# Patient Record
Sex: FEMALE | Race: WHITE | NOT HISPANIC OR LATINO | Employment: OTHER | URBAN - METROPOLITAN AREA
[De-identification: names, ages, dates, MRNs, and addresses within clinical notes are randomized per-mention and may not be internally consistent; named-entity substitution may affect disease eponyms.]

---

## 2017-03-17 ENCOUNTER — APPOINTMENT (OUTPATIENT)
Dept: WOMENS IMAGING | Facility: HOSPITAL | Age: 67
End: 2017-03-17

## 2017-03-17 DIAGNOSIS — Z00.00 ANNUAL PHYSICAL EXAM: Primary | ICD-10-CM

## 2017-03-17 DIAGNOSIS — Z00.00 HEALTH CARE MAINTENANCE: ICD-10-CM

## 2017-03-17 PROCEDURE — 77067 SCR MAMMO BI INCL CAD: CPT | Performed by: RADIOLOGY

## 2017-03-20 LAB
25(OH)D3+25(OH)D2 SERPL-MCNC: 39 NG/ML
ALBUMIN SERPL-MCNC: 4.3 G/DL (ref 3.5–5.2)
ALBUMIN/GLOB SERPL: 1.7 G/DL
ALP SERPL-CCNC: 67 U/L (ref 40–129)
ALT SERPL-CCNC: 12 U/L (ref 5–33)
AST SERPL-CCNC: 16 U/L (ref 5–32)
BASOPHILS # BLD AUTO: 0.02 10*3/MM3 (ref 0–0.2)
BASOPHILS NFR BLD AUTO: 0.4 % (ref 0–2)
BILIRUB SERPL-MCNC: 0.6 MG/DL (ref 0.2–1.2)
BUN SERPL-MCNC: 11 MG/DL (ref 8–23)
BUN/CREAT SERPL: 11.6 (ref 7–25)
CALCIUM SERPL-MCNC: 9.4 MG/DL (ref 8.8–10.5)
CHLORIDE SERPL-SCNC: 104 MMOL/L (ref 98–107)
CHOLEST SERPL-MCNC: 195 MG/DL (ref 0–200)
CO2 SERPL-SCNC: 27.7 MMOL/L (ref 22–29)
CREAT SERPL-MCNC: 0.95 MG/DL (ref 0.57–1)
EOSINOPHIL # BLD AUTO: 0.14 10*3/MM3 (ref 0.1–0.3)
EOSINOPHIL NFR BLD AUTO: 3 % (ref 0–4)
ERYTHROCYTE [DISTWIDTH] IN BLOOD BY AUTOMATED COUNT: 12.8 % (ref 11.5–14.5)
GLOBULIN SER CALC-MCNC: 2.6 GM/DL
GLUCOSE SERPL-MCNC: 90 MG/DL (ref 65–99)
HCT VFR BLD AUTO: 41.9 % (ref 37–47)
HDLC SERPL-MCNC: 53 MG/DL (ref 40–60)
HGB BLD-MCNC: 14.3 G/DL (ref 12–16)
IMM GRANULOCYTES # BLD: 0.01 10*3/MM3 (ref 0–0.03)
IMM GRANULOCYTES NFR BLD: 0.2 % (ref 0–0.5)
LDLC SERPL CALC-MCNC: 115 MG/DL (ref 0–100)
LYMPHOCYTES # BLD AUTO: 1.34 10*3/MM3 (ref 0.6–4.8)
LYMPHOCYTES NFR BLD AUTO: 28.7 % (ref 20–45)
MCH RBC QN AUTO: 30.4 PG (ref 27–31)
MCHC RBC AUTO-ENTMCNC: 34.1 G/DL (ref 31–37)
MCV RBC AUTO: 89 FL (ref 81–99)
MONOCYTES # BLD AUTO: 0.2 10*3/MM3 (ref 0–1)
MONOCYTES NFR BLD AUTO: 4.3 % (ref 3–8)
NEUTROPHILS # BLD AUTO: 2.96 10*3/MM3 (ref 1.5–8.3)
NEUTROPHILS NFR BLD AUTO: 63.4 % (ref 45–70)
NRBC BLD AUTO-RTO: 0 /100 WBC (ref 0–0)
PLATELET # BLD AUTO: 270 10*3/MM3 (ref 140–500)
POTASSIUM SERPL-SCNC: 4.2 MMOL/L (ref 3.5–5.2)
PROT SERPL-MCNC: 6.9 G/DL (ref 6–8.5)
RBC # BLD AUTO: 4.71 10*6/MM3 (ref 4.2–5.4)
SODIUM SERPL-SCNC: 143 MMOL/L (ref 136–145)
TRIGL SERPL-MCNC: 135 MG/DL (ref 0–150)
TSH SERPL DL<=0.005 MIU/L-ACNC: 4.39 MIU/ML (ref 0.27–4.2)
VLDLC SERPL CALC-MCNC: 27 MG/DL (ref 7–27)
WBC # BLD AUTO: 4.67 10*3/MM3 (ref 4.8–10.8)

## 2017-03-27 ENCOUNTER — OFFICE VISIT (OUTPATIENT)
Dept: FAMILY MEDICINE CLINIC | Facility: CLINIC | Age: 67
End: 2017-03-27

## 2017-03-27 VITALS
TEMPERATURE: 98.2 F | WEIGHT: 159 LBS | BODY MASS INDEX: 29.26 KG/M2 | HEIGHT: 62 IN | HEART RATE: 92 BPM | OXYGEN SATURATION: 95 % | DIASTOLIC BLOOD PRESSURE: 68 MMHG | SYSTOLIC BLOOD PRESSURE: 120 MMHG

## 2017-03-27 DIAGNOSIS — B02.29 POSTHERPETIC NEURALGIA: ICD-10-CM

## 2017-03-27 DIAGNOSIS — E03.9 HYPOTHYROIDISM (ACQUIRED): ICD-10-CM

## 2017-03-27 DIAGNOSIS — E55.9 AVITAMINOSIS D: Primary | ICD-10-CM

## 2017-03-27 PROCEDURE — 99213 OFFICE O/P EST LOW 20 MIN: CPT | Performed by: FAMILY MEDICINE

## 2017-03-27 RX ORDER — GABAPENTIN 300 MG/1
300 CAPSULE ORAL DAILY
Qty: 90 CAPSULE | Refills: 1 | Status: SHIPPED | OUTPATIENT
Start: 2017-03-27 | End: 2017-08-28

## 2017-03-27 RX ORDER — LEVOTHYROXINE SODIUM 0.05 MG/1
50 TABLET ORAL DAILY
Qty: 90 TABLET | Refills: 0 | Status: SHIPPED | OUTPATIENT
Start: 2017-03-27 | End: 2017-07-11 | Stop reason: SDUPTHER

## 2017-03-27 RX ORDER — CYCLOBENZAPRINE HCL 5 MG
5 TABLET ORAL 3 TIMES DAILY PRN
Qty: 90 TABLET | Refills: 0 | Status: SHIPPED | OUTPATIENT
Start: 2017-03-27 | End: 2018-07-17 | Stop reason: SDUPTHER

## 2017-03-27 NOTE — PATIENT INSTRUCTIONS
Start 50mcg Levothyroxine and RTO in 6 to 8 weeks for non fasting labs, rechecking Thyroid and Vitamin D.

## 2017-03-27 NOTE — PROGRESS NOTES
Subjective   Fatoumata Perry is a 67 y.o. female with   Chief Complaint   Patient presents with   • Hyperlipidemia     follow up on labs, also needs pneumovax   .    History of Present Illness     Fatoumata is a 67 yr old white female that presents today for follow up of HLD, and Vitamin D Deficiency. Fatoumata currently uses Gabapentin to control pain secondary to Postherpetic Neuralgia.  She has also been using Vitamin D3 at 2000 IU per day to improve vitamin D Def.    Fatoumata has had pain and numbness to her lower right extremity at the level of her lateral thigh.This has resolved however at this time.    She reports that she is trying to exercise more.      The following portions of the patient's history were reviewed and updated as appropriate: allergies, current medications, past family history, past medical history, past social history, past surgical history and problem list.    Review of Systems   Endocrine:        Vit. D Def.     Neurological: Positive for numbness (to lower right extremity).   All other systems reviewed and are negative.      Objective     Vitals:    03/27/17 0738   BP: 120/68   Pulse: 92   Temp: 98.2 °F (36.8 °C)   SpO2: 95%     BP Readings from Last 3 Encounters:   03/27/17 120/68   10/18/16 122/66   06/21/16 120/80      Wt Readings from Last 3 Encounters:   03/27/17 159 lb (72.1 kg)   10/18/16 157 lb (71.2 kg)   06/21/16 161 lb (73 kg)        No results found for this or any previous visit (from the past 168 hour(s)).  The above results have been reviewed and explained to Fatoumata with her understanding.  A copy has been provided to her.     Physical Exam   Constitutional: She is oriented to person, place, and time. She appears well-developed and well-nourished.   HENT:   Head: Normocephalic and atraumatic.   Neck: Trachea normal and phonation normal. Neck supple. Normal carotid pulses present. Carotid bruit is not present. No thyroid mass and no thyromegaly present.   Cardiovascular:  Normal rate, regular rhythm and normal heart sounds.  Exam reveals no gallop and no friction rub.    No murmur heard.  Pulmonary/Chest: Effort normal and breath sounds normal. No respiratory distress. She has no decreased breath sounds. She has no wheezes. She has no rhonchi. She has no rales.   Lymphadenopathy:     She has no cervical adenopathy.   Neurological: She is alert and oriented to person, place, and time.   Skin: Skin is warm and dry. No rash noted.   Psychiatric: She has a normal mood and affect. Her speech is normal and behavior is normal. Judgment and thought content normal. Cognition and memory are normal.   Nursing note and vitals reviewed.      Assessment/Plan   Fatoumata was seen today for hyperlipidemia.    Diagnoses and all orders for this visit:    Avitaminosis D  -     Vitamin D 25 Hydroxy; Future    Postherpetic neuralgia    Hypothyroidism (acquired)  -     CBC & Differential; Future  -     Comprehensive Metabolic Panel; Future  -     TSH; Future    Other orders  -     gabapentin (NEURONTIN) 300 MG capsule; Take 1 capsule by mouth Daily.  -     levothyroxine (SYNTHROID) 50 MCG tablet; Take 1 tablet by mouth Daily.  -     cyclobenzaprine (FLEXERIL) 5 MG tablet; Take 1 tablet by mouth 3 (Three) Times a Day As Needed for Muscle Spasms.        Return in about 6 months (around 9/27/2017).    Scribed for Cosme Cho MD by Angel Rajan. 03/27/2017    ICosme MD personally performed the services described in this documentation, as scribed by Angel Rajan in my presence, and it is both accurate and complete

## 2017-04-28 DIAGNOSIS — E03.9 HYPOTHYROIDISM (ACQUIRED): ICD-10-CM

## 2017-04-28 DIAGNOSIS — E55.9 AVITAMINOSIS D: ICD-10-CM

## 2017-05-01 LAB
25(OH)D3+25(OH)D2 SERPL-MCNC: 37 NG/ML
ALBUMIN SERPL-MCNC: 4 G/DL (ref 3.5–5.2)
ALBUMIN/GLOB SERPL: 1.5 G/DL
ALP SERPL-CCNC: 75 U/L (ref 40–129)
ALT SERPL-CCNC: 16 U/L (ref 5–33)
AST SERPL-CCNC: 18 U/L (ref 5–32)
BASOPHILS # BLD AUTO: 0.03 10*3/MM3 (ref 0–0.2)
BASOPHILS NFR BLD AUTO: 0.7 % (ref 0–2)
BILIRUB SERPL-MCNC: 0.4 MG/DL (ref 0.2–1.2)
BUN SERPL-MCNC: 19 MG/DL (ref 8–23)
BUN/CREAT SERPL: 22.9 (ref 7–25)
CALCIUM SERPL-MCNC: 8.9 MG/DL (ref 8.8–10.5)
CHLORIDE SERPL-SCNC: 104 MMOL/L (ref 98–107)
CO2 SERPL-SCNC: 26.5 MMOL/L (ref 22–29)
CREAT SERPL-MCNC: 0.83 MG/DL (ref 0.57–1)
EOSINOPHIL # BLD AUTO: 0.11 10*3/MM3 (ref 0.1–0.3)
EOSINOPHIL NFR BLD AUTO: 2.4 % (ref 0–4)
ERYTHROCYTE [DISTWIDTH] IN BLOOD BY AUTOMATED COUNT: 13.4 % (ref 11.5–14.5)
GLOBULIN SER CALC-MCNC: 2.7 GM/DL
GLUCOSE SERPL-MCNC: 76 MG/DL (ref 65–99)
HCT VFR BLD AUTO: 40.7 % (ref 37–47)
HGB BLD-MCNC: 13.9 G/DL (ref 12–16)
IMM GRANULOCYTES # BLD: 0.01 10*3/MM3 (ref 0–0.03)
IMM GRANULOCYTES NFR BLD: 0.2 % (ref 0–0.5)
LYMPHOCYTES # BLD AUTO: 1.18 10*3/MM3 (ref 0.6–4.8)
LYMPHOCYTES NFR BLD AUTO: 25.8 % (ref 20–45)
MCH RBC QN AUTO: 30.3 PG (ref 27–31)
MCHC RBC AUTO-ENTMCNC: 34.2 G/DL (ref 31–37)
MCV RBC AUTO: 88.9 FL (ref 81–99)
MONOCYTES # BLD AUTO: 0.18 10*3/MM3 (ref 0–1)
MONOCYTES NFR BLD AUTO: 3.9 % (ref 3–8)
NEUTROPHILS # BLD AUTO: 3.07 10*3/MM3 (ref 1.5–8.3)
NEUTROPHILS NFR BLD AUTO: 67 % (ref 45–70)
NRBC BLD AUTO-RTO: 0 /100 WBC (ref 0–0)
PLATELET # BLD AUTO: 265 10*3/MM3 (ref 140–500)
POTASSIUM SERPL-SCNC: 4.2 MMOL/L (ref 3.5–5.2)
PROT SERPL-MCNC: 6.7 G/DL (ref 6–8.5)
RBC # BLD AUTO: 4.58 10*6/MM3 (ref 4.2–5.4)
SODIUM SERPL-SCNC: 143 MMOL/L (ref 136–145)
TSH SERPL DL<=0.005 MIU/L-ACNC: 0.96 MIU/ML (ref 0.27–4.2)
WBC # BLD AUTO: 4.58 10*3/MM3 (ref 4.8–10.8)

## 2017-07-11 RX ORDER — LEVOTHYROXINE SODIUM 0.05 MG/1
50 TABLET ORAL DAILY
Qty: 90 TABLET | Refills: 0 | Status: SHIPPED | OUTPATIENT
Start: 2017-07-11 | End: 2017-08-28 | Stop reason: SDUPTHER

## 2017-08-21 DIAGNOSIS — E55.9 AVITAMINOSIS D: Primary | ICD-10-CM

## 2017-08-21 DIAGNOSIS — E03.9 HYPOTHYROIDISM (ACQUIRED): ICD-10-CM

## 2017-08-21 LAB
25(OH)D3+25(OH)D2 SERPL-MCNC: 41.9 NG/ML
ALBUMIN SERPL-MCNC: 4.2 G/DL (ref 3.5–5.2)
ALBUMIN/GLOB SERPL: 1.5 G/DL
ALP SERPL-CCNC: 72 U/L (ref 40–129)
ALT SERPL-CCNC: 18 U/L (ref 5–33)
AST SERPL-CCNC: 21 U/L (ref 5–32)
BASOPHILS # BLD AUTO: 0.02 10*3/MM3 (ref 0–0.2)
BASOPHILS NFR BLD AUTO: 0.5 % (ref 0–2)
BILIRUB SERPL-MCNC: 0.7 MG/DL (ref 0.2–1.2)
BUN SERPL-MCNC: 12 MG/DL (ref 8–23)
BUN/CREAT SERPL: 13.6 (ref 7–25)
CALCIUM SERPL-MCNC: 9.1 MG/DL (ref 8.8–10.5)
CHLORIDE SERPL-SCNC: 106 MMOL/L (ref 98–107)
CO2 SERPL-SCNC: 25.2 MMOL/L (ref 22–29)
CREAT SERPL-MCNC: 0.88 MG/DL (ref 0.57–1)
EOSINOPHIL # BLD AUTO: 0.12 10*3/MM3 (ref 0.1–0.3)
EOSINOPHIL NFR BLD AUTO: 2.8 % (ref 0–4)
ERYTHROCYTE [DISTWIDTH] IN BLOOD BY AUTOMATED COUNT: 13.3 % (ref 11.5–14.5)
GLOBULIN SER CALC-MCNC: 2.8 GM/DL
GLUCOSE SERPL-MCNC: 93 MG/DL (ref 65–99)
HCT VFR BLD AUTO: 42.5 % (ref 37–47)
HGB BLD-MCNC: 14.2 G/DL (ref 12–16)
IMM GRANULOCYTES # BLD: 0.01 10*3/MM3 (ref 0–0.03)
IMM GRANULOCYTES NFR BLD: 0.2 % (ref 0–0.5)
LYMPHOCYTES # BLD AUTO: 1.23 10*3/MM3 (ref 0.6–4.8)
LYMPHOCYTES NFR BLD AUTO: 29.1 % (ref 20–45)
MCH RBC QN AUTO: 30 PG (ref 27–31)
MCHC RBC AUTO-ENTMCNC: 33.4 G/DL (ref 31–37)
MCV RBC AUTO: 89.9 FL (ref 81–99)
MONOCYTES # BLD AUTO: 0.2 10*3/MM3 (ref 0–1)
MONOCYTES NFR BLD AUTO: 4.7 % (ref 3–8)
NEUTROPHILS # BLD AUTO: 2.64 10*3/MM3 (ref 1.5–8.3)
NEUTROPHILS NFR BLD AUTO: 62.7 % (ref 45–70)
NRBC BLD AUTO-RTO: 0 /100 WBC (ref 0–0)
PLATELET # BLD AUTO: 257 10*3/MM3 (ref 140–500)
POTASSIUM SERPL-SCNC: 4.2 MMOL/L (ref 3.5–5.2)
PROT SERPL-MCNC: 7 G/DL (ref 6–8.5)
RBC # BLD AUTO: 4.73 10*6/MM3 (ref 4.2–5.4)
SODIUM SERPL-SCNC: 145 MMOL/L (ref 136–145)
TSH SERPL DL<=0.005 MIU/L-ACNC: 1.29 MIU/ML (ref 0.27–4.2)
WBC # BLD AUTO: 4.22 10*3/MM3 (ref 4.8–10.8)

## 2017-08-28 ENCOUNTER — OFFICE VISIT (OUTPATIENT)
Dept: FAMILY MEDICINE CLINIC | Facility: CLINIC | Age: 67
End: 2017-08-28

## 2017-08-28 VITALS
SYSTOLIC BLOOD PRESSURE: 120 MMHG | HEART RATE: 77 BPM | BODY MASS INDEX: 29.08 KG/M2 | WEIGHT: 158 LBS | TEMPERATURE: 98.9 F | OXYGEN SATURATION: 98 % | HEIGHT: 62 IN | DIASTOLIC BLOOD PRESSURE: 70 MMHG

## 2017-08-28 DIAGNOSIS — E03.9 ACQUIRED HYPOTHYROIDISM: ICD-10-CM

## 2017-08-28 DIAGNOSIS — Z23 IMMUNIZATION DUE: ICD-10-CM

## 2017-08-28 DIAGNOSIS — E55.9 AVITAMINOSIS D: Primary | ICD-10-CM

## 2017-08-28 PROCEDURE — G0009 ADMIN PNEUMOCOCCAL VACCINE: HCPCS | Performed by: FAMILY MEDICINE

## 2017-08-28 PROCEDURE — 99213 OFFICE O/P EST LOW 20 MIN: CPT | Performed by: FAMILY MEDICINE

## 2017-08-28 PROCEDURE — 90732 PPSV23 VACC 2 YRS+ SUBQ/IM: CPT | Performed by: FAMILY MEDICINE

## 2017-08-28 RX ORDER — LEVOTHYROXINE SODIUM 0.05 MG/1
50 TABLET ORAL DAILY
Qty: 90 TABLET | Refills: 1 | Status: SHIPPED | OUTPATIENT
Start: 2017-08-28 | End: 2018-03-29 | Stop reason: SDUPTHER

## 2017-08-28 NOTE — PROGRESS NOTES
Subjective   Fatoumata Perry is a 67 y.o. female with   Chief Complaint   Patient presents with   • Hypothyroidism     medication follow up   • immunization due     needs pneumovax and TDAP.  Must get TDAP at pharmacy   .    History of Present Illness     Patient presents today for follow up of Hypothyroidism, and Vitamin D Deficiency.  Current medications are well tolerated.  Patient has requested Pneumovax and TdAp to be sent to her pharmacy.     The following portions of the patient's history were reviewed and updated as appropriate: allergies, current medications, past family history, past medical history, past social history, past surgical history and problem list.    Review of Systems   Endocrine:        Vit D Def  hypothyroidism    All other systems reviewed and are negative.      Objective     Vitals:    08/28/17 0812   BP: 120/70   Pulse: 77   Temp: 98.9 °F (37.2 °C)   SpO2: 98%     BP Readings from Last 3 Encounters:   08/28/17 120/70   03/27/17 120/68   10/18/16 122/66      Wt Readings from Last 3 Encounters:   08/28/17 158 lb (71.7 kg)   03/27/17 159 lb (72.1 kg)   10/18/16 157 lb (71.2 kg)        No results found for this or any previous visit (from the past 168 hour(s)).  The above results have been reviewed and explained to the patient.  The patient has been provided a copy    Physical Exam   Constitutional: She is oriented to person, place, and time. She appears well-developed and well-nourished.   HENT:   Head: Normocephalic and atraumatic.   Neck: Trachea normal and phonation normal. Neck supple. Normal carotid pulses present. Carotid bruit is not present. No thyroid mass and no thyromegaly present.   Cardiovascular: Normal rate, regular rhythm and normal heart sounds.  Exam reveals no gallop and no friction rub.    No murmur heard.  Pulmonary/Chest: Effort normal and breath sounds normal. No respiratory distress. She has no decreased breath sounds. She has no wheezes. She has no rhonchi. She  has no rales.   Lymphadenopathy:     She has no cervical adenopathy.   Neurological: She is alert and oriented to person, place, and time.   Skin: Skin is warm and dry. No rash noted.   Psychiatric: She has a normal mood and affect. Her speech is normal and behavior is normal. Judgment and thought content normal. Cognition and memory are normal.   Nursing note and vitals reviewed.      Assessment/Plan   Fatoumata was seen today for hypothyroidism and immunization due.    Diagnoses and all orders for this visit:    Avitaminosis D    Acquired hypothyroidism    Immunization due  -     Discontinue: pneumococcal polysaccharide 23-valent (PNEUMOVAX-23) vaccine 0.5 mL; Inject 0.5 mL into the shoulder, thigh, or buttocks 1 (One) Time.  -     Pneumococcal Polysaccharide Vaccine 23-Valent Greater Than or Equal To 1yo Subcutaneous / IM    Other orders  -     levothyroxine (SYNTHROID) 50 MCG tablet; Take 1 tablet by mouth Daily.  -     Tdap (BOOSTRIX) 5-2.5-18.5 LF-MCG/0.5 injection; Inject 0.5 mL into the shoulder, thigh, or buttocks 1 (One) Time for 1 dose.        Return in about 6 months (around 2/28/2018).    Scribed for Cosme Cho MD by Angel Rajan. 08/28/2017    ICosme MD personally performed the services described in this documentation, as scribed by Angel Rajan in my presence, and it is both accurate and complete

## 2017-12-19 ENCOUNTER — TELEPHONE (OUTPATIENT)
Dept: FAMILY MEDICINE CLINIC | Facility: CLINIC | Age: 67
End: 2017-12-19

## 2017-12-19 RX ORDER — AZITHROMYCIN 250 MG/1
TABLET, FILM COATED ORAL
Qty: 6 TABLET | Refills: 0 | Status: SHIPPED | OUTPATIENT
Start: 2017-12-19 | End: 2018-04-23

## 2017-12-19 NOTE — TELEPHONE ENCOUNTER
Pt calling with headache, facial pain and pressure, congestion and dry cough x few days.  She is going out of town tomorrow and requests an antibiotic.  Please advise.

## 2018-03-29 RX ORDER — LEVOTHYROXINE SODIUM 0.05 MG/1
50 TABLET ORAL DAILY
Qty: 90 TABLET | Refills: 1 | Status: SHIPPED | OUTPATIENT
Start: 2018-03-29 | End: 2018-04-23 | Stop reason: SDUPTHER

## 2018-04-12 DIAGNOSIS — E55.9 AVITAMINOSIS D: Primary | ICD-10-CM

## 2018-04-12 DIAGNOSIS — Z00.00 HEALTH CARE MAINTENANCE: ICD-10-CM

## 2018-04-12 DIAGNOSIS — E03.9 HYPOTHYROIDISM (ACQUIRED): ICD-10-CM

## 2018-04-16 LAB
25(OH)D3+25(OH)D2 SERPL-MCNC: 50.3 NG/ML
ALBUMIN SERPL-MCNC: 4.3 G/DL (ref 3.5–5.2)
ALBUMIN/GLOB SERPL: 1.6 G/DL
ALP SERPL-CCNC: 76 U/L (ref 40–129)
ALT SERPL-CCNC: 11 U/L (ref 5–33)
AST SERPL-CCNC: 17 U/L (ref 5–32)
BASOPHILS # BLD AUTO: 0.03 10*3/MM3 (ref 0–0.2)
BASOPHILS NFR BLD AUTO: 0.8 % (ref 0–2)
BILIRUB SERPL-MCNC: 0.4 MG/DL (ref 0.2–1.2)
BUN SERPL-MCNC: 10 MG/DL (ref 8–23)
BUN/CREAT SERPL: 11.5 (ref 7–25)
CALCIUM SERPL-MCNC: 9.1 MG/DL (ref 8.8–10.5)
CHLORIDE SERPL-SCNC: 107 MMOL/L (ref 98–107)
CHOLEST SERPL-MCNC: 195 MG/DL (ref 0–200)
CO2 SERPL-SCNC: 27.6 MMOL/L (ref 22–29)
CREAT SERPL-MCNC: 0.87 MG/DL (ref 0.57–1)
EOSINOPHIL # BLD AUTO: 0.14 10*3/MM3 (ref 0.1–0.3)
EOSINOPHIL NFR BLD AUTO: 3.5 % (ref 0–4)
ERYTHROCYTE [DISTWIDTH] IN BLOOD BY AUTOMATED COUNT: 13 % (ref 11.5–14.5)
GFR SERPLBLD CREATININE-BSD FMLA CKD-EPI: 65 ML/MIN/1.73
GFR SERPLBLD CREATININE-BSD FMLA CKD-EPI: 78 ML/MIN/1.73
GLOBULIN SER CALC-MCNC: 2.7 GM/DL
GLUCOSE SERPL-MCNC: 88 MG/DL (ref 65–99)
HCT VFR BLD AUTO: 43.3 % (ref 37–47)
HDLC SERPL-MCNC: 56 MG/DL (ref 40–60)
HGB BLD-MCNC: 14.8 G/DL (ref 12–16)
IMM GRANULOCYTES # BLD: 0.01 10*3/MM3 (ref 0–0.03)
IMM GRANULOCYTES NFR BLD: 0.3 % (ref 0–0.5)
LDLC SERPL CALC-MCNC: 117 MG/DL (ref 0–100)
LYMPHOCYTES # BLD AUTO: 1.23 10*3/MM3 (ref 0.6–4.8)
LYMPHOCYTES NFR BLD AUTO: 30.8 % (ref 20–45)
MCH RBC QN AUTO: 31.2 PG (ref 27–31)
MCHC RBC AUTO-ENTMCNC: 34.2 G/DL (ref 31–37)
MCV RBC AUTO: 91.2 FL (ref 81–99)
MONOCYTES # BLD AUTO: 0.17 10*3/MM3 (ref 0–1)
MONOCYTES NFR BLD AUTO: 4.3 % (ref 3–8)
NEUTROPHILS # BLD AUTO: 2.42 10*3/MM3 (ref 1.5–8.3)
NEUTROPHILS NFR BLD AUTO: 60.3 % (ref 45–70)
NRBC BLD AUTO-RTO: 0 /100 WBC (ref 0–0)
PLATELET # BLD AUTO: 258 10*3/MM3 (ref 140–500)
POTASSIUM SERPL-SCNC: 4.4 MMOL/L (ref 3.5–5.2)
PROT SERPL-MCNC: 7 G/DL (ref 6–8.5)
RBC # BLD AUTO: 4.75 10*6/MM3 (ref 4.2–5.4)
SODIUM SERPL-SCNC: 145 MMOL/L (ref 136–145)
TRIGL SERPL-MCNC: 112 MG/DL (ref 0–150)
TSH SERPL DL<=0.005 MIU/L-ACNC: 1.83 MIU/ML (ref 0.27–4.2)
VLDLC SERPL CALC-MCNC: 22.4 MG/DL (ref 7–27)
WBC # BLD AUTO: 4 10*3/MM3 (ref 4.8–10.8)

## 2018-04-23 ENCOUNTER — OFFICE VISIT (OUTPATIENT)
Dept: FAMILY MEDICINE CLINIC | Facility: CLINIC | Age: 68
End: 2018-04-23

## 2018-04-23 VITALS
DIASTOLIC BLOOD PRESSURE: 72 MMHG | HEIGHT: 62 IN | SYSTOLIC BLOOD PRESSURE: 112 MMHG | HEART RATE: 76 BPM | BODY MASS INDEX: 27.86 KG/M2 | WEIGHT: 151.4 LBS | OXYGEN SATURATION: 98 % | TEMPERATURE: 98 F | RESPIRATION RATE: 16 BRPM

## 2018-04-23 DIAGNOSIS — E55.9 AVITAMINOSIS D: ICD-10-CM

## 2018-04-23 DIAGNOSIS — Z00.00 ROUTINE ADULT HEALTH MAINTENANCE: ICD-10-CM

## 2018-04-23 DIAGNOSIS — E03.9 HYPOTHYROIDISM (ACQUIRED): Primary | ICD-10-CM

## 2018-04-23 PROCEDURE — 99213 OFFICE O/P EST LOW 20 MIN: CPT | Performed by: FAMILY MEDICINE

## 2018-04-23 RX ORDER — LEVOTHYROXINE SODIUM 0.05 MG/1
50 TABLET ORAL DAILY
Qty: 90 TABLET | Refills: 1 | Status: SHIPPED | OUTPATIENT
Start: 2018-04-23 | End: 2018-09-25 | Stop reason: SDUPTHER

## 2018-04-23 NOTE — PATIENT INSTRUCTIONS
Results for orders placed or performed in visit on 04/12/18   Comprehensive Metabolic Panel   Result Value Ref Range    Glucose 88 65 - 99 mg/dL    BUN 10 8 - 23 mg/dL    Creatinine 0.87 0.57 - 1.00 mg/dL    eGFR Non African Am 65 >60 mL/min/1.73    eGFR African Am 78 >60 mL/min/1.73    BUN/Creatinine Ratio 11.5 7.0 - 25.0    Sodium 145 136 - 145 mmol/L    Potassium 4.4 3.5 - 5.2 mmol/L    Chloride 107 98 - 107 mmol/L    Total CO2 27.6 22.0 - 29.0 mmol/L    Calcium 9.1 8.8 - 10.5 mg/dL    Total Protein 7.0 6.0 - 8.5 g/dL    Albumin 4.30 3.50 - 5.20 g/dL    Globulin 2.7 gm/dL    A/G Ratio 1.6 g/dL    Total Bilirubin 0.4 0.2 - 1.2 mg/dL    Alkaline Phosphatase 76 40 - 129 U/L    AST (SGOT) 17 5 - 32 U/L    ALT (SGPT) 11 5 - 33 U/L   Lipid Panel   Result Value Ref Range    Total Cholesterol 195 0 - 200 mg/dL    Triglycerides 112 0 - 150 mg/dL    HDL Cholesterol 56 40 - 60 mg/dL    VLDL Cholesterol 22.4 7 - 27 mg/dL    LDL Cholesterol  117 (H) 0 - 100 mg/dL   TSH   Result Value Ref Range    TSH 1.830 0.270 - 4.200 mIU/mL   Vitamin D 25 Hydroxy   Result Value Ref Range    25 Hydroxy, Vitamin D 50.3 ng/ml   CBC & Differential   Result Value Ref Range    WBC 4.00 (L) 4.80 - 10.80 10*3/mm3    RBC 4.75 4.20 - 5.40 10*6/mm3    Hemoglobin 14.8 12.0 - 16.0 g/dL    Hematocrit 43.3 37.0 - 47.0 %    MCV 91.2 81.0 - 99.0 fL    MCH 31.2 (H) 27.0 - 31.0 pg    MCHC 34.2 31.0 - 37.0 g/dL    RDW 13.0 11.5 - 14.5 %    Platelets 258 140 - 500 10*3/mm3    Neutrophil Rel % 60.3 45.0 - 70.0 %    Lymphocyte Rel % 30.8 20.0 - 45.0 %    Monocyte Rel % 4.3 3.0 - 8.0 %    Eosinophil Rel % 3.5 0.0 - 4.0 %    Basophil Rel % 0.8 0.0 - 2.0 %    Neutrophils Absolute 2.42 1.50 - 8.30 10*3/mm3    Lymphocytes Absolute 1.23 0.60 - 4.80 10*3/mm3    Monocytes Absolute 0.17 0.00 - 1.00 10*3/mm3    Eosinophils Absolute 0.14 0.10 - 0.30 10*3/mm3    Basophils Absolute 0.03 0.00 - 0.20 10*3/mm3    Immature Granulocyte Rel % 0.3 0.0 - 0.5 %    Immature Grans  Absolute 0.01 0.00 - 0.03 10*3/mm3    nRBC 0.0 0.0 - 0.0 /100 WBC

## 2018-04-23 NOTE — PROGRESS NOTES
Subjective   Fatoumata Perry is a 68 y.o. female with   Chief Complaint   Patient presents with   • Follow-up     On labs   • Hypothyroidism   .    History of Present Illness     Pt presents for stable Hypothyroidism and Vitamin D Def.  Pt has lost 8 pounds since last visit.  Pt tolerates medications well.  Last mammogram was approximately 2 years ago, patient states due to insurance she is unable to schedule a mammogram or dexa scan  at this time.  Pt see's Dr Ng for routine pap smears and its been about 2 years ago since last pap.    The following portions of the patient's history were reviewed and updated as appropriate: allergies, current medications, past family history, past medical history, past social history, past surgical history and problem list.    Review of Systems   Endocrine: Negative for cold intolerance and heat intolerance.        Hypothyroid  Vit D Def   All other systems reviewed and are negative.      Objective     Vitals:    04/23/18 0802   BP: 112/72   Pulse: 76   Resp: 16   Temp: 98 °F (36.7 °C)   SpO2: 98%     BP Readings from Last 3 Encounters:   04/23/18 112/72   08/28/17 120/70   03/27/17 120/68      Wt Readings from Last 3 Encounters:   04/23/18 68.7 kg (151 lb 6.4 oz)   08/28/17 71.7 kg (158 lb)   03/27/17 72.1 kg (159 lb)          No results found for this or any previous visit (from the past 168 hour(s)).    Physical Exam   Constitutional: She is oriented to person, place, and time. She appears well-developed and well-nourished.   HENT:   Head: Normocephalic and atraumatic.   Neck: Trachea normal and phonation normal. Neck supple. Normal carotid pulses present. Carotid bruit is not present. No thyroid mass and no thyromegaly present.   Cardiovascular: Normal rate, regular rhythm and normal heart sounds.  Exam reveals no gallop and no friction rub.    No murmur heard.  Pulmonary/Chest: Effort normal and breath sounds normal. No respiratory distress. She has no decreased breath  sounds. She has no wheezes. She has no rhonchi. She has no rales.   Lymphadenopathy:     She has no cervical adenopathy.   Neurological: She is alert and oriented to person, place, and time.   Skin: Skin is warm and dry. No rash noted.   Psychiatric: She has a normal mood and affect. Her speech is normal and behavior is normal. Judgment and thought content normal. Cognition and memory are normal.   Nursing note and vitals reviewed.      Assessment/Plan   Fatoumata was seen today for follow-up and hypothyroidism.    Diagnoses and all orders for this visit:    Hypothyroidism (acquired)  -     TSH; Future    Avitaminosis D  -     Vitamin D 25 Hydroxy; Future    Routine adult health maintenance  -     CBC & Differential; Future  -     Comprehensive Metabolic Panel; Future  -     Lipid Panel; Future    Other orders  -     levothyroxine (SYNTHROID, LEVOTHROID) 50 MCG tablet; Take 1 tablet by mouth Daily.      Patient Instructions     Results for orders placed or performed in visit on 04/12/18   Comprehensive Metabolic Panel   Result Value Ref Range    Glucose 88 65 - 99 mg/dL    BUN 10 8 - 23 mg/dL    Creatinine 0.87 0.57 - 1.00 mg/dL    eGFR Non African Am 65 >60 mL/min/1.73    eGFR African Am 78 >60 mL/min/1.73    BUN/Creatinine Ratio 11.5 7.0 - 25.0    Sodium 145 136 - 145 mmol/L    Potassium 4.4 3.5 - 5.2 mmol/L    Chloride 107 98 - 107 mmol/L    Total CO2 27.6 22.0 - 29.0 mmol/L    Calcium 9.1 8.8 - 10.5 mg/dL    Total Protein 7.0 6.0 - 8.5 g/dL    Albumin 4.30 3.50 - 5.20 g/dL    Globulin 2.7 gm/dL    A/G Ratio 1.6 g/dL    Total Bilirubin 0.4 0.2 - 1.2 mg/dL    Alkaline Phosphatase 76 40 - 129 U/L    AST (SGOT) 17 5 - 32 U/L    ALT (SGPT) 11 5 - 33 U/L   Lipid Panel   Result Value Ref Range    Total Cholesterol 195 0 - 200 mg/dL    Triglycerides 112 0 - 150 mg/dL    HDL Cholesterol 56 40 - 60 mg/dL    VLDL Cholesterol 22.4 7 - 27 mg/dL    LDL Cholesterol  117 (H) 0 - 100 mg/dL   TSH   Result Value Ref Range    TSH  1.830 0.270 - 4.200 mIU/mL   Vitamin D 25 Hydroxy   Result Value Ref Range    25 Hydroxy, Vitamin D 50.3 ng/ml   CBC & Differential   Result Value Ref Range    WBC 4.00 (L) 4.80 - 10.80 10*3/mm3    RBC 4.75 4.20 - 5.40 10*6/mm3    Hemoglobin 14.8 12.0 - 16.0 g/dL    Hematocrit 43.3 37.0 - 47.0 %    MCV 91.2 81.0 - 99.0 fL    MCH 31.2 (H) 27.0 - 31.0 pg    MCHC 34.2 31.0 - 37.0 g/dL    RDW 13.0 11.5 - 14.5 %    Platelets 258 140 - 500 10*3/mm3    Neutrophil Rel % 60.3 45.0 - 70.0 %    Lymphocyte Rel % 30.8 20.0 - 45.0 %    Monocyte Rel % 4.3 3.0 - 8.0 %    Eosinophil Rel % 3.5 0.0 - 4.0 %    Basophil Rel % 0.8 0.0 - 2.0 %    Neutrophils Absolute 2.42 1.50 - 8.30 10*3/mm3    Lymphocytes Absolute 1.23 0.60 - 4.80 10*3/mm3    Monocytes Absolute 0.17 0.00 - 1.00 10*3/mm3    Eosinophils Absolute 0.14 0.10 - 0.30 10*3/mm3    Basophils Absolute 0.03 0.00 - 0.20 10*3/mm3    Immature Granulocyte Rel % 0.3 0.0 - 0.5 %    Immature Grans Absolute 0.01 0.00 - 0.03 10*3/mm3    nRBC 0.0 0.0 - 0.0 /100 WBC             Return in about 6 months (around 10/23/2018).    Scribed for Cosme Cho MD by Britt Marin CMA. 04/23/2018    I, Cosme Cho MD personally performed the services described in this documentation, as scribed by Britt Marin CMA in my presence, and it is both accurate and complete

## 2018-07-17 RX ORDER — CYCLOBENZAPRINE HCL 5 MG
5 TABLET ORAL 3 TIMES DAILY PRN
Qty: 90 TABLET | Refills: 0 | Status: SHIPPED | OUTPATIENT
Start: 2018-07-17 | End: 2018-08-30 | Stop reason: SDUPTHER

## 2018-08-28 DIAGNOSIS — E03.9 HYPOTHYROIDISM (ACQUIRED): Primary | ICD-10-CM

## 2018-08-30 RX ORDER — CYCLOBENZAPRINE HCL 5 MG
5 TABLET ORAL 3 TIMES DAILY PRN
Qty: 90 TABLET | Refills: 0 | Status: SHIPPED | OUTPATIENT
Start: 2018-08-30 | End: 2019-02-08

## 2018-09-25 ENCOUNTER — TELEPHONE (OUTPATIENT)
Dept: FAMILY MEDICINE CLINIC | Facility: CLINIC | Age: 68
End: 2018-09-25

## 2018-09-25 RX ORDER — LEVOTHYROXINE SODIUM 0.05 MG/1
50 TABLET ORAL DAILY
Qty: 90 TABLET | Refills: 1 | Status: SHIPPED | OUTPATIENT
Start: 2018-09-25 | End: 2019-04-03 | Stop reason: SDUPTHER

## 2018-09-25 NOTE — TELEPHONE ENCOUNTER
Fatoumata wants to know her lab results. She states she can not come in due to work reasons. She also said she is almost out of her medication.

## 2018-11-23 ENCOUNTER — RESULTS ENCOUNTER (OUTPATIENT)
Dept: FAMILY MEDICINE CLINIC | Facility: CLINIC | Age: 68
End: 2018-11-23

## 2018-11-23 DIAGNOSIS — Z00.00 ROUTINE ADULT HEALTH MAINTENANCE: ICD-10-CM

## 2018-11-23 DIAGNOSIS — E55.9 AVITAMINOSIS D: ICD-10-CM

## 2018-11-23 DIAGNOSIS — E03.9 HYPOTHYROIDISM (ACQUIRED): ICD-10-CM

## 2019-01-30 ENCOUNTER — TELEPHONE (OUTPATIENT)
Dept: FAMILY MEDICINE CLINIC | Facility: CLINIC | Age: 69
End: 2019-01-30

## 2019-01-30 RX ORDER — NAPROXEN 500 MG/1
500 TABLET ORAL 2 TIMES DAILY WITH MEALS
Qty: 60 TABLET | Refills: 0 | Status: SHIPPED | OUTPATIENT
Start: 2019-01-30 | End: 2019-03-01 | Stop reason: SDUPTHER

## 2019-01-30 NOTE — TELEPHONE ENCOUNTER
"Fatoumata said that she has TMJ and she states she has a new mouth guard that is not working. With \"every bite or movement she makes her jaw pops and cracks more.\" She wants to know if you can send in something for inflammation.  "

## 2019-02-08 ENCOUNTER — TELEPHONE (OUTPATIENT)
Dept: FAMILY MEDICINE CLINIC | Facility: CLINIC | Age: 69
End: 2019-02-08

## 2019-02-08 DIAGNOSIS — M26.629 TENDERNESS OF TEMPOROMANDIBULAR JOINT, UNSPECIFIED LATERALITY: Primary | ICD-10-CM

## 2019-02-08 RX ORDER — BACLOFEN 10 MG/1
10 TABLET ORAL 3 TIMES DAILY PRN
Qty: 20 TABLET | Refills: 0 | Status: SHIPPED | OUTPATIENT
Start: 2019-02-08 | End: 2019-03-01 | Stop reason: SDUPTHER

## 2019-02-08 NOTE — TELEPHONE ENCOUNTER
I left message on pts VM that a muscle relaxer was being sent to her pharmacy and she should continue taking Naproxen and apply ice to area.

## 2019-02-08 NOTE — TELEPHONE ENCOUNTER
Pt is still having a lot of TMJ pain.  Naproxen has not helped.  Is there something else she can try?

## 2019-03-01 DIAGNOSIS — M26.629 TENDERNESS OF TEMPOROMANDIBULAR JOINT, UNSPECIFIED LATERALITY: ICD-10-CM

## 2019-03-01 RX ORDER — BACLOFEN 10 MG/1
10 TABLET ORAL 3 TIMES DAILY PRN
Qty: 20 TABLET | Refills: 0 | Status: SHIPPED | OUTPATIENT
Start: 2019-03-01 | End: 2019-03-25 | Stop reason: ALTCHOICE

## 2019-03-01 RX ORDER — NAPROXEN 500 MG/1
500 TABLET ORAL 2 TIMES DAILY WITH MEALS
Qty: 60 TABLET | Refills: 0 | Status: SHIPPED | OUTPATIENT
Start: 2019-03-01 | End: 2019-03-25 | Stop reason: SDUPTHER

## 2019-03-22 LAB
25(OH)D3+25(OH)D2 SERPL-MCNC: 44 NG/ML (ref 30–100)
ALBUMIN SERPL-MCNC: 4.1 G/DL (ref 3.5–5.2)
ALBUMIN/GLOB SERPL: 1.7 G/DL
ALP SERPL-CCNC: 66 U/L (ref 39–117)
ALT SERPL-CCNC: 9 U/L (ref 1–33)
AST SERPL-CCNC: 14 U/L (ref 1–32)
BASOPHILS # BLD AUTO: 0.03 10*3/MM3 (ref 0–0.2)
BASOPHILS NFR BLD AUTO: 0.6 % (ref 0–1.5)
BILIRUB SERPL-MCNC: 0.3 MG/DL (ref 0.2–1.2)
BUN SERPL-MCNC: 13 MG/DL (ref 8–23)
BUN/CREAT SERPL: 16.5 (ref 7–25)
CALCIUM SERPL-MCNC: 9.1 MG/DL (ref 8.6–10.5)
CHLORIDE SERPL-SCNC: 105 MMOL/L (ref 98–107)
CHOLEST SERPL-MCNC: 155 MG/DL (ref 0–200)
CO2 SERPL-SCNC: 24.3 MMOL/L (ref 22–29)
CREAT SERPL-MCNC: 0.79 MG/DL (ref 0.57–1)
EOSINOPHIL # BLD AUTO: 0.21 10*3/MM3 (ref 0–0.4)
EOSINOPHIL NFR BLD AUTO: 4.4 % (ref 0.3–6.2)
ERYTHROCYTE [DISTWIDTH] IN BLOOD BY AUTOMATED COUNT: 12.8 % (ref 12.3–15.4)
GLOBULIN SER CALC-MCNC: 2.4 GM/DL
GLUCOSE SERPL-MCNC: 84 MG/DL (ref 65–99)
HCT VFR BLD AUTO: 40.7 % (ref 34–46.6)
HDLC SERPL-MCNC: 49 MG/DL (ref 40–60)
HGB BLD-MCNC: 13.7 G/DL (ref 12–15.9)
IMM GRANULOCYTES # BLD AUTO: 0.01 10*3/MM3 (ref 0–0.05)
IMM GRANULOCYTES NFR BLD AUTO: 0.2 % (ref 0–0.5)
LDLC SERPL CALC-MCNC: 93 MG/DL (ref 0–100)
LYMPHOCYTES # BLD AUTO: 1.04 10*3/MM3 (ref 0.7–3.1)
LYMPHOCYTES NFR BLD AUTO: 21.7 % (ref 19.6–45.3)
MCH RBC QN AUTO: 31.3 PG (ref 26.6–33)
MCHC RBC AUTO-ENTMCNC: 33.7 G/DL (ref 31.5–35.7)
MCV RBC AUTO: 92.9 FL (ref 79–97)
MONOCYTES # BLD AUTO: 0.19 10*3/MM3 (ref 0.1–0.9)
MONOCYTES NFR BLD AUTO: 4 % (ref 5–12)
NEUTROPHILS # BLD AUTO: 3.31 10*3/MM3 (ref 1.4–7)
NEUTROPHILS NFR BLD AUTO: 69.1 % (ref 42.7–76)
NRBC BLD AUTO-RTO: 0 /100 WBC (ref 0–0)
ONE SPECIMEN IDENTIFIER: NORMAL
PLATELET # BLD AUTO: 261 10*3/MM3 (ref 140–450)
POTASSIUM SERPL-SCNC: 4.1 MMOL/L (ref 3.5–5.2)
PROT SERPL-MCNC: 6.5 G/DL (ref 6–8.5)
RBC # BLD AUTO: 4.38 10*6/MM3 (ref 3.77–5.28)
SODIUM SERPL-SCNC: 143 MMOL/L (ref 136–145)
TRIGL SERPL-MCNC: 67 MG/DL (ref 0–150)
TSH SERPL DL<=0.005 MIU/L-ACNC: 2.45 MIU/ML (ref 0.27–4.2)
VLDLC SERPL CALC-MCNC: 13.4 MG/DL (ref 5–40)
WBC # BLD AUTO: 4.79 10*3/MM3 (ref 3.4–10.8)

## 2019-03-25 ENCOUNTER — OFFICE VISIT (OUTPATIENT)
Dept: FAMILY MEDICINE CLINIC | Facility: CLINIC | Age: 69
End: 2019-03-25

## 2019-03-25 VITALS
DIASTOLIC BLOOD PRESSURE: 70 MMHG | HEART RATE: 85 BPM | RESPIRATION RATE: 16 BRPM | OXYGEN SATURATION: 98 % | SYSTOLIC BLOOD PRESSURE: 118 MMHG | WEIGHT: 152 LBS | HEIGHT: 62 IN | BODY MASS INDEX: 27.97 KG/M2 | TEMPERATURE: 98.6 F

## 2019-03-25 DIAGNOSIS — M26.609 TMJ (TEMPOROMANDIBULAR JOINT DISORDER): ICD-10-CM

## 2019-03-25 DIAGNOSIS — K12.1 STOMATITIS: Primary | ICD-10-CM

## 2019-03-25 PROCEDURE — 99213 OFFICE O/P EST LOW 20 MIN: CPT | Performed by: NURSE PRACTITIONER

## 2019-03-25 RX ORDER — NAPROXEN 500 MG/1
500 TABLET ORAL 2 TIMES DAILY WITH MEALS
Qty: 60 TABLET | Refills: 0 | Status: SHIPPED | OUTPATIENT
Start: 2019-03-25 | End: 2020-02-11

## 2019-03-25 RX ORDER — ACYCLOVIR 400 MG/1
400 TABLET ORAL 3 TIMES DAILY
Qty: 21 TABLET | Refills: 0 | Status: SHIPPED | OUTPATIENT
Start: 2019-03-25 | End: 2019-04-01

## 2019-03-25 RX ORDER — CYCLOBENZAPRINE HCL 10 MG
10 TABLET ORAL 3 TIMES DAILY PRN
Qty: 90 TABLET | Refills: 0 | Status: SHIPPED | OUTPATIENT
Start: 2019-03-25 | End: 2019-07-01 | Stop reason: SDUPTHER

## 2019-03-25 RX ORDER — TRIAMCINOLONE ACETONIDE 0.1 %
PASTE (GRAM) DENTAL 2 TIMES DAILY
Qty: 5 G | Refills: 0 | Status: SHIPPED | OUTPATIENT
Start: 2019-03-25 | End: 2020-02-11

## 2019-03-25 NOTE — PATIENT INSTRUCTIONS
Stomatitis  Stomatitis is a condition that causes swelling (inflammation) in your mouth. It can affect a part of your mouth or your whole mouth. The condition often affects your cheek, teeth, gums, lips, and tongue. Stomatitis can also affect the mucous membranes that surround your mouth (mucosa).  Pain from stomatitis can make it hard for you to eat or drink. Very bad cases of this condition can lead to not getting enough fluid in your body (dehydration) or poor nutrition.  Follow these instructions at home:  Medicines  · Take medicines only as told by your doctor.  · If you were prescribed an antibiotic, finish all of it even if you start to feel better.  Lifestyle  · Take good care of your mouth and teeth (oral hygiene):  ? Gently brush your teeth with a soft, nylon-bristled toothbrush two times each day.  ? Floss your teeth every day.  ? Have your teeth cleaned regularly. Do this as told by your dentist.  · Eat a balanced diet. Do not eat:  ? Spicy foods.  ? Citrus, such as oranges.  ? Foods that have sharp edges, such as chips.  · Avoid any foods or other things that you think may be causing this condition.  · If you have dentures, make sure that they fit the way that they should.  · Do not use any tobacco products, including cigarettes, chewing tobacco, or electronic cigarettes. If you need help quitting, ask your doctor.  · Find ways to lower your stress. Try yoga or meditation. Ask your doctor for other ideas.  General instructions  · Use a salt-water rinse for pain as told by your doctor. Mix 1 tsp of salt in 2 cups of water.  · Drink enough fluid to keep your pee (urine) clear or pale yellow. This will keep you hydrated.  Contact a doctor if:  · Your symptoms get worse.  · You develop new symptoms, especially:  ? A rash.  ? New symptoms that do not involve your mouth area.  · Your symptoms last longer than three weeks.  · Your stomatitis goes away and then comes back.  · You have a harder time eating and  drinking normally.  · You are more tired.  · You feel weaker.  · You stop feeling hungry.  · You feel sick to your stomach (nauseous).  · You have a fever.  This information is not intended to replace advice given to you by your health care provider. Make sure you discuss any questions you have with your health care provider.  Document Released: 12/06/2012 Document Revised: 08/16/2017 Document Reviewed: 12/14/2015  [employer] Interactive Patient Education © 2019 [employer]

## 2019-03-25 NOTE — PROGRESS NOTES
Patient ID: Fatoumata Perry is a 69 y.o. female     Subjective     Chief Complaint   Patient presents with   • Mouth Lesions       History of Present Illness    Fatoumata Perry presents to the office today for complaints of oral mouth sores.  Onset about one week ago.  Has been suffering from TMJ for about 4 months.  Has been fitted for mouth guard which she feels she had a reaction too.  She has tried swishing hydrogen peroxide and oral anbesol without relief.  Lesions bleed when trying to brush teeth. She has problems talking due to pain from sores. She also is in need of refill for flexeril and naproxen to help control TMJ pain.     She denies any complaints of fever, chills, cough, chest pain, shortness of air, abdominal pain, nausea, or any other concerns.     The following portions of the patient's history were reviewed and updated as appropriate: allergies, current medications, past family history, past medical history, past social history, past surgical history and problem list.       Review of Systems   HENT:        Oral ulcers and TMJ   All other systems reviewed and are negative.       Vitals:    03/25/19 1536   BP: 118/70   Pulse: 85   Resp: 16   Temp: 98.6 °F (37 °C)   SpO2: 98%       Documented weights    03/25/19 1536   Weight: 68.9 kg (152 lb)     Body mass index is 28.03 kg/m².    Results for orders placed or performed in visit on 11/23/18   Comprehensive Metabolic Panel   Result Value Ref Range    Glucose 84 65 - 99 mg/dL    BUN 13 8 - 23 mg/dL    Creatinine 0.79 0.57 - 1.00 mg/dL    eGFR Non African Am 72 >60 mL/min/1.73    eGFR African Am 87 >60 mL/min/1.73    BUN/Creatinine Ratio 16.5 7.0 - 25.0    Sodium 143 136 - 145 mmol/L    Potassium 4.1 3.5 - 5.2 mmol/L    Chloride 105 98 - 107 mmol/L    Total CO2 24.3 22.0 - 29.0 mmol/L    Calcium 9.1 8.6 - 10.5 mg/dL    Total Protein 6.5 6.0 - 8.5 g/dL    Albumin 4.10 3.50 - 5.20 g/dL    Globulin 2.4 gm/dL    A/G Ratio 1.7 g/dL    Total Bilirubin  0.3 0.2 - 1.2 mg/dL    Alkaline Phosphatase 66 39 - 117 U/L    AST (SGOT) 14 1 - 32 U/L    ALT (SGPT) 9 1 - 33 U/L   Lipid Panel   Result Value Ref Range    Total Cholesterol 155 0 - 200 mg/dL    Triglycerides 67 0 - 150 mg/dL    HDL Cholesterol 49 40 - 60 mg/dL    VLDL Cholesterol 13.4 5 - 40 mg/dL    LDL Cholesterol  93 0 - 100 mg/dL   TSH   Result Value Ref Range    TSH 2.450 0.270 - 4.200 mIU/mL   Vitamin D 25 Hydroxy   Result Value Ref Range    25 Hydroxy, Vitamin D 44.0 30.0 - 100.0 ng/ml   One Specimen Identifier   Result Value Ref Range    One Specimen Identifier Comment    CBC & Differential   Result Value Ref Range    WBC 4.79 3.40 - 10.80 10*3/mm3    RBC 4.38 3.77 - 5.28 10*6/mm3    Hemoglobin 13.7 12.0 - 15.9 g/dL    Hematocrit 40.7 34.0 - 46.6 %    MCV 92.9 79.0 - 97.0 fL    MCH 31.3 26.6 - 33.0 pg    MCHC 33.7 31.5 - 35.7 g/dL    RDW 12.8 12.3 - 15.4 %    Platelets 261 140 - 450 10*3/mm3    Neutrophil Rel % 69.1 42.7 - 76.0 %    Lymphocyte Rel % 21.7 19.6 - 45.3 %    Monocyte Rel % 4.0 (L) 5.0 - 12.0 %    Eosinophil Rel % 4.4 0.3 - 6.2 %    Basophil Rel % 0.6 0.0 - 1.5 %    Neutrophils Absolute 3.31 1.40 - 7.00 10*3/mm3    Lymphocytes Absolute 1.04 0.70 - 3.10 10*3/mm3    Monocytes Absolute 0.19 0.10 - 0.90 10*3/mm3    Eosinophils Absolute 0.21 0.00 - 0.40 10*3/mm3    Basophils Absolute 0.03 0.00 - 0.20 10*3/mm3    Immature Granulocyte Rel % 0.2 0.0 - 0.5 %    Immature Grans Absolute 0.01 0.00 - 0.05 10*3/mm3    nRBC 0.0 0.0 - 0.0 /100 WBC       Objective     Physical Exam   Constitutional: She appears well-developed. No distress.   HENT:   Positive TMJ of left side.  Numerous oral ulcers to both side of mouth and under tongue.  One to right lower lip.     Cardiovascular: Normal rate.   Pulmonary/Chest: Effort normal.   Psychiatric: Her mood appears anxious.          Assessment/Plan     Assessment/Plan   Fatoumata was seen today for mouth lesions.    Diagnoses and all orders for this  visit:    Stomatitis  -     triamcinolone (KENALOG) 0.1 % paste; Apply  to teeth 2 (Two) Times a Day.  -     magic mouthwash oral suspension; Swish and spit 10 mL Every 4 (Four) Hours As Needed for stomatitis.    TMJ (temporomandibular joint disorder)    Other orders  -     acyclovir (ZOVIRAX) 400 MG tablet; Take 1 tablet by mouth 3 (Three) Times a Day for 7 days. Take no more than 5 doses a day.  -     cyclobenzaprine (FLEXERIL) 10 MG tablet; Take 1 tablet by mouth 3 (Three) Times a Day As Needed for Muscle Spasms.  -     naproxen (NAPROSYN) 500 MG tablet; Take 1 tablet by mouth 2 (Two) Times a Day With Meals.      Summary:  Fatoumata العلي Orlando presented for numerous oral ulcers.  Zovirax as directed.  Magic mouthwash and triamcinolone paste for pain relief.  Refilled naproxen and flexeril for control of TMJ pain.      In the meantime, instructed to contact us sooner for any problems or concerns.    Ramandeep Meza, APRN  Family Medicine  [facility] [place]  03/25/19  4:30 PM

## 2019-04-03 ENCOUNTER — OFFICE VISIT (OUTPATIENT)
Dept: FAMILY MEDICINE CLINIC | Facility: CLINIC | Age: 69
End: 2019-04-03

## 2019-04-03 VITALS
HEIGHT: 61 IN | WEIGHT: 154 LBS | DIASTOLIC BLOOD PRESSURE: 72 MMHG | SYSTOLIC BLOOD PRESSURE: 124 MMHG | BODY MASS INDEX: 29.07 KG/M2 | OXYGEN SATURATION: 99 % | HEART RATE: 87 BPM

## 2019-04-03 DIAGNOSIS — E55.9 AVITAMINOSIS D: ICD-10-CM

## 2019-04-03 DIAGNOSIS — Z12.31 VISIT FOR SCREENING MAMMOGRAM: ICD-10-CM

## 2019-04-03 DIAGNOSIS — Z00.00 MEDICARE ANNUAL WELLNESS VISIT, SUBSEQUENT: Primary | ICD-10-CM

## 2019-04-03 DIAGNOSIS — Z78.0 POSTMENOPAUSAL: ICD-10-CM

## 2019-04-03 DIAGNOSIS — M85.9 DISORDER OF BONE DENSITY AND STRUCTURE, UNSPECIFIED: ICD-10-CM

## 2019-04-03 DIAGNOSIS — E03.9 HYPOTHYROIDISM (ACQUIRED): ICD-10-CM

## 2019-04-03 DIAGNOSIS — M85.80 OSTEOPENIA, UNSPECIFIED LOCATION: ICD-10-CM

## 2019-04-03 PROCEDURE — G0439 PPPS, SUBSEQ VISIT: HCPCS | Performed by: FAMILY MEDICINE

## 2019-04-03 PROCEDURE — 99213 OFFICE O/P EST LOW 20 MIN: CPT | Performed by: FAMILY MEDICINE

## 2019-04-03 RX ORDER — LEVOTHYROXINE SODIUM 0.05 MG/1
50 TABLET ORAL DAILY
Qty: 90 TABLET | Refills: 1 | Status: SHIPPED | OUTPATIENT
Start: 2019-04-03 | End: 2019-09-13 | Stop reason: SDUPTHER

## 2019-04-03 NOTE — PROGRESS NOTES
QUICK REFERENCE INFORMATION:  The ABCs of Providing the Annual Wellness Visit   CMS[employer] Subsequent Wellness Visit      Subjective   History of Present Illness    Fatoumata Perry is a 69 y.o. female who presents for an Subsequent Wellness Visit. In addition, we addressed the following health issues: stable Vitamin D Def and stable Hypothyroidism.  She is currently taking Levothyroxine 50 mcg daily.  She will occasionally take Flexeril 10 mg, Naproxen 500 mg and she tolerates all of these well.Dipesh's blood pressure and weight remain stable.  Pt states she is healthy and doing well without acute complaint.      PMH, PSH, SocHx, FamHx, Allergies, and Medications: Reviewed and updated in the Visit Navigator.     Outpatient Medications Prior to Visit   Medication Sig Dispense Refill   • cyclobenzaprine (FLEXERIL) 10 MG tablet Take 1 tablet by mouth 3 (Three) Times a Day As Needed for Muscle Spasms. 90 tablet 0   • Flaxseed, Linseed, (FLAX SEED OIL PO) Take  by mouth.     • naproxen (NAPROSYN) 500 MG tablet Take 1 tablet by mouth 2 (Two) Times a Day With Meals. 60 tablet 0   • triamcinolone (KENALOG) 0.1 % paste Apply  to teeth 2 (Two) Times a Day. 5 g 0   • levothyroxine (SYNTHROID, LEVOTHROID) 50 MCG tablet Take 1 tablet by mouth Daily. 90 tablet 1     No facility-administered medications prior to visit.        Patient Active Problem List   Diagnosis   • Avitaminosis D   • Postherpetic neuralgia   • Shingles outbreak   • Hypothyroidism (acquired)   • Osteopenia       Health Habits:  Dental Exam. up to date  Eye Exam. up to date  Exercise: 2 times/week.  Current exercise activities include: dancing    Social:  See review in SnapShot activity and in SocHx section of Visit Navigator.    Health Risk Assessment:  The patient has completed a Health Risk Assessment. This has been reviewed with them and has been scanned into OnBase as a separate document.    Current Medical Providers:  Patient  Care Team:  Cosme Cho DO as PCP - General  Cosme Cho DO as PCP - Claims Attributed    The [facility] providers who are involved in the care of this patient are listed above. Additional providers and suppliers are listed below:       Recent Hospitalizations:  No recent hospitalization(s)..    Age-appropriate Screening Schedule:  Refer to the list below for future screening recommendations based on patient's age. Orders for these recommended tests are listed in the plan section. The patient has been provided with a written plan.    Health Maintenance   Topic Date Due   • TDAP/TD VACCINES (1 - Tdap) 01/19/1969   • HEPATITIS C SCREENING  05/02/2016   • MEDICARE ANNUAL WELLNESS  05/02/2016   • MAMMOGRAM  05/02/2016   • COLONOSCOPY  05/02/2016   • ZOSTER VACCINE (2 of 3) 10/07/2016   • DXA SCAN  04/04/2019   • INFLUENZA VACCINE  08/01/2019   • PNEUMOCOCCAL VACCINES (65+ LOW/MEDIUM RISK)  Completed       Depression Screen:   PHQ-2/PHQ-9 Depression Screening 4/3/2019   Little interest or pleasure in doing things 0   Feeling down, depressed, or hopeless 0   Trouble falling or staying asleep, or sleeping too much 0   Feeling tired or having little energy 0   Poor appetite or overeating 0   Feeling bad about yourself - or that you are a failure or have let yourself or your family down 0   Trouble concentrating on things, such as reading the newspaper or watching television 0   Moving or speaking so slowly that other people could have noticed. Or the opposite - being so fidgety or restless that you have been moving around a lot more than usual 0   Thoughts that you would be better off dead, or of hurting yourself in some way 0   Total Score 0       Functional and Cognitive Screening:  Functional & Cognitive Status 4/3/2019   Do you have difficulty preparing food and eating? No   Do you have difficulty bathing yourself, getting dressed or grooming yourself? No   Do you have difficulty using the toilet? No   Do  you have difficulty moving around from place to place? No   Do you have trouble with steps or getting out of a bed or a chair? No   In the past year have you fallen or experienced a near fall? No   Current Diet Well Balanced Diet   Dental Exam Not up to date   Eye Exam Not up to date   Exercise (times per week) 2 times per week   Current Exercise Activities Include Dancing   Do you need help using the phone?  No   Are you deaf or do you have serious difficulty hearing?  No   Do you need help with transportation? No   Do you need help shopping? No   Do you need help preparing meals?  No   Do you need help with housework?  No   Do you need help with laundry? No   Do you need help taking your medications? No   Do you need help managing money? No   Do you ever drive or ride in a car without wearing a seat belt? No   Have you felt unusual stress, anger or loneliness in the last month? No   Who do you live with? Alone   If you need help, do you have trouble finding someone available to you? No   Have you been bothered in the last four weeks by sexual problems? No   Do you have difficulty concentrating, remembering or making decisions? No       Does the patient have evidence of cognitive impairment? No    Identification of Risk Factors:  Risk factors include: Osteoprorosis risk identified;  Use Calcium and Vitamin D as directed, avoid caffeine, participate in weight bearing exercises for better bone health and Bone Densiometry ordered  Colon Cancer screening is due;  Colonoscopy Ordered  Mammogram Screening is due; Screening Mammogram ordered  Immunizations Discussed/Encouraged (specific immunizations; Shingrix ).    Review of Systems   Endocrine: Negative for cold intolerance and heat intolerance.        Hypothyroidism  Vitamin D Def   All other systems reviewed and are negative.    Physical Exam   Constitutional: She is oriented to person, place, and time. She appears well-developed and well-nourished.   HENT:   Head:  "Normocephalic and atraumatic.   Neck: Trachea normal and phonation normal. Neck supple. Normal carotid pulses present. Carotid bruit is not present. No thyroid mass and no thyromegaly present.   Cardiovascular: Normal rate, regular rhythm and normal heart sounds. Exam reveals no gallop and no friction rub.   No murmur heard.  Pulmonary/Chest: Effort normal and breath sounds normal. No respiratory distress. She has no decreased breath sounds. She has no wheezes. She has no rhonchi. She has no rales.   Lymphadenopathy:     She has no cervical adenopathy.   Neurological: She is alert and oriented to person, place, and time.   Skin: Skin is warm and dry. No rash noted.   Psychiatric: She has a normal mood and affect. Her speech is normal and behavior is normal. Judgment and thought content normal. Cognition and memory are normal.   Nursing note and vitals reviewed.    Pertinent items are noted in HPI.    Objective     Vitals:    04/03/19 1052   BP: 124/72   Pulse: 87   SpO2: 99%   Weight: 69.9 kg (154 lb)   Height: 154.9 cm (61\")     BP Readings from Last 3 Encounters:   04/03/19 124/72   03/25/19 118/70   04/23/18 112/72      Wt Readings from Last 3 Encounters:   04/03/19 69.9 kg (154 lb)   03/25/19 68.9 kg (152 lb)   04/23/18 68.7 kg (151 lb 6.4 oz)      Body mass index is 29.1 kg/m².  No visits with results within 2 Week(s) from this visit.   Latest known visit with results is:   Results Encounter on 11/23/2018   Component Date Value Ref Range Status   • WBC 03/22/2019 4.79  3.40 - 10.80 10*3/mm3 Final   • RBC 03/22/2019 4.38  3.77 - 5.28 10*6/mm3 Final   • Hemoglobin 03/22/2019 13.7  12.0 - 15.9 g/dL Final   • Hematocrit 03/22/2019 40.7  34.0 - 46.6 % Final   • MCV 03/22/2019 92.9  79.0 - 97.0 fL Final   • MCH 03/22/2019 31.3  26.6 - 33.0 pg Final   • MCHC 03/22/2019 33.7  31.5 - 35.7 g/dL Final   • RDW 03/22/2019 12.8  12.3 - 15.4 % Final   • Platelets 03/22/2019 261  140 - 450 10*3/mm3 Final   • Neutrophil Rel % " 03/22/2019 69.1  42.7 - 76.0 % Final   • Lymphocyte Rel % 03/22/2019 21.7  19.6 - 45.3 % Final   • Monocyte Rel % 03/22/2019 4.0* 5.0 - 12.0 % Final   • Eosinophil Rel % 03/22/2019 4.4  0.3 - 6.2 % Final   • Basophil Rel % 03/22/2019 0.6  0.0 - 1.5 % Final   • Neutrophils Absolute 03/22/2019 3.31  1.40 - 7.00 10*3/mm3 Final   • Lymphocytes Absolute 03/22/2019 1.04  0.70 - 3.10 10*3/mm3 Final   • Monocytes Absolute 03/22/2019 0.19  0.10 - 0.90 10*3/mm3 Final   • Eosinophils Absolute 03/22/2019 0.21  0.00 - 0.40 10*3/mm3 Final   • Basophils Absolute 03/22/2019 0.03  0.00 - 0.20 10*3/mm3 Final   • Immature Granulocyte Rel % 03/22/2019 0.2  0.0 - 0.5 % Final   • Immature Grans Absolute 03/22/2019 0.01  0.00 - 0.05 10*3/mm3 Final   • nRBC 03/22/2019 0.0  0.0 - 0.0 /100 WBC Final   • Glucose 03/22/2019 84  65 - 99 mg/dL Final   • BUN 03/22/2019 13  8 - 23 mg/dL Final   • Creatinine 03/22/2019 0.79  0.57 - 1.00 mg/dL Final   • eGFR Non African Am 03/22/2019 72  >60 mL/min/1.73 Final   • eGFR African Am 03/22/2019 87  >60 mL/min/1.73 Final   • BUN/Creatinine Ratio 03/22/2019 16.5  7.0 - 25.0 Final   • Sodium 03/22/2019 143  136 - 145 mmol/L Final   • Potassium 03/22/2019 4.1  3.5 - 5.2 mmol/L Final   • Chloride 03/22/2019 105  98 - 107 mmol/L Final   • Total CO2 03/22/2019 24.3  22.0 - 29.0 mmol/L Final   • Calcium 03/22/2019 9.1  8.6 - 10.5 mg/dL Final   • Total Protein 03/22/2019 6.5  6.0 - 8.5 g/dL Final   • Albumin 03/22/2019 4.10  3.50 - 5.20 g/dL Final   • Globulin 03/22/2019 2.4  gm/dL Final   • A/G Ratio 03/22/2019 1.7  g/dL Final   • Total Bilirubin 03/22/2019 0.3  0.2 - 1.2 mg/dL Final   • Alkaline Phosphatase 03/22/2019 66  39 - 117 U/L Final   • AST (SGOT) 03/22/2019 14  1 - 32 U/L Final   • ALT (SGPT) 03/22/2019 9  1 - 33 U/L Final   • Total Cholesterol 03/22/2019 155  0 - 200 mg/dL Final   • Triglycerides 03/22/2019 67  0 - 150 mg/dL Final   • HDL Cholesterol 03/22/2019 49  40 - 60 mg/dL Final   • VLDL  "Cholesterol 03/22/2019 13.4  5 - 40 mg/dL Final   • LDL Cholesterol  03/22/2019 93  0 - 100 mg/dL Final   • TSH 03/22/2019 2.450  0.270 - 4.200 mIU/mL Final   • 25 Hydroxy, Vitamin D 03/22/2019 44.0  30.0 - 100.0 ng/ml Final    Comment: Reference Range for Total Vitamin D 25(OH)  Deficiency <20.0 ng/mL  Insufficiency 21-29 ng/mL  Sufficiency  ng/mL  Toxicity >100 ng/ml     • One Specimen Identifier 03/22/2019 Comment   Final    Comment: The specimen received included only one patient identifier on the  primary collection container.  Our laboratory accrediting agency  states \"All primary specimen containers must be labeled with 2  identifiers at the time of collection.\"         Assessment/Plan   Patient Self-Management and Personalized Health Advice  The patient has been provided with information about: diet, exercise, weight management and prevention of cardiac or vascular disease and preventive services including:   · Colorectal Cancer Screening, Colonoscopy  · Depression Screening (15 minutes face to face, Code ).    Discussed the patient's BMI with her. The BMI is in the acceptable range.    Orders:  Orders Placed This Encounter   Procedures   • Mammo Screening Bilateral With CAD   • DEXA Bone Density Axial     Patient Instructions     No visits with results within 2 Week(s) from this visit.   Latest known visit with results is:   Results Encounter on 11/23/2018   Component Date Value Ref Range Status   • WBC 03/22/2019 4.79  3.40 - 10.80 10*3/mm3 Final   • RBC 03/22/2019 4.38  3.77 - 5.28 10*6/mm3 Final   • Hemoglobin 03/22/2019 13.7  12.0 - 15.9 g/dL Final   • Hematocrit 03/22/2019 40.7  34.0 - 46.6 % Final   • MCV 03/22/2019 92.9  79.0 - 97.0 fL Final   • MCH 03/22/2019 31.3  26.6 - 33.0 pg Final   • MCHC 03/22/2019 33.7  31.5 - 35.7 g/dL Final   • RDW 03/22/2019 12.8  12.3 - 15.4 % Final   • Platelets 03/22/2019 261  140 - 450 10*3/mm3 Final   • Neutrophil Rel % 03/22/2019 69.1  42.7 - 76.0 % Final "   • Lymphocyte Rel % 03/22/2019 21.7  19.6 - 45.3 % Final   • Monocyte Rel % 03/22/2019 4.0* 5.0 - 12.0 % Final   • Eosinophil Rel % 03/22/2019 4.4  0.3 - 6.2 % Final   • Basophil Rel % 03/22/2019 0.6  0.0 - 1.5 % Final   • Neutrophils Absolute 03/22/2019 3.31  1.40 - 7.00 10*3/mm3 Final   • Lymphocytes Absolute 03/22/2019 1.04  0.70 - 3.10 10*3/mm3 Final   • Monocytes Absolute 03/22/2019 0.19  0.10 - 0.90 10*3/mm3 Final   • Eosinophils Absolute 03/22/2019 0.21  0.00 - 0.40 10*3/mm3 Final   • Basophils Absolute 03/22/2019 0.03  0.00 - 0.20 10*3/mm3 Final   • Immature Granulocyte Rel % 03/22/2019 0.2  0.0 - 0.5 % Final   • Immature Grans Absolute 03/22/2019 0.01  0.00 - 0.05 10*3/mm3 Final   • nRBC 03/22/2019 0.0  0.0 - 0.0 /100 WBC Final   • Glucose 03/22/2019 84  65 - 99 mg/dL Final   • BUN 03/22/2019 13  8 - 23 mg/dL Final   • Creatinine 03/22/2019 0.79  0.57 - 1.00 mg/dL Final   • eGFR Non African Am 03/22/2019 72  >60 mL/min/1.73 Final   • eGFR African Am 03/22/2019 87  >60 mL/min/1.73 Final   • BUN/Creatinine Ratio 03/22/2019 16.5  7.0 - 25.0 Final   • Sodium 03/22/2019 143  136 - 145 mmol/L Final   • Potassium 03/22/2019 4.1  3.5 - 5.2 mmol/L Final   • Chloride 03/22/2019 105  98 - 107 mmol/L Final   • Total CO2 03/22/2019 24.3  22.0 - 29.0 mmol/L Final   • Calcium 03/22/2019 9.1  8.6 - 10.5 mg/dL Final   • Total Protein 03/22/2019 6.5  6.0 - 8.5 g/dL Final   • Albumin 03/22/2019 4.10  3.50 - 5.20 g/dL Final   • Globulin 03/22/2019 2.4  gm/dL Final   • A/G Ratio 03/22/2019 1.7  g/dL Final   • Total Bilirubin 03/22/2019 0.3  0.2 - 1.2 mg/dL Final   • Alkaline Phosphatase 03/22/2019 66  39 - 117 U/L Final   • AST (SGOT) 03/22/2019 14  1 - 32 U/L Final   • ALT (SGPT) 03/22/2019 9  1 - 33 U/L Final   • Total Cholesterol 03/22/2019 155  0 - 200 mg/dL Final   • Triglycerides 03/22/2019 67  0 - 150 mg/dL Final   • HDL Cholesterol 03/22/2019 49  40 - 60 mg/dL Final   • VLDL Cholesterol 03/22/2019 13.4  5 - 40 mg/dL  "Final   • LDL Cholesterol  03/22/2019 93  0 - 100 mg/dL Final   • TSH 03/22/2019 2.450  0.270 - 4.200 mIU/mL Final   • 25 Hydroxy, Vitamin D 03/22/2019 44.0  30.0 - 100.0 ng/ml Final    Comment: Reference Range for Total Vitamin D 25(OH)  Deficiency <20.0 ng/mL  Insufficiency 21-29 ng/mL  Sufficiency  ng/mL  Toxicity >100 ng/ml     • One Specimen Identifier 03/22/2019 Comment   Final    Comment: The specimen received included only one patient identifier on the  primary collection container.  Our laboratory accrediting agency  states \"All primary specimen containers must be labeled with 2  identifiers at the time of collection.\"             Follow Up:  Return in about 1 year (around 4/3/2020).     An After Visit Summary and PPPS with all of these plans were given to the patient.      Scribed for Cosme Cho MD by Britt Marin CMA. 04/03/2019    ICosme MD personally performed the services described in this documentation, as scribed by Britt Marin CMA in my presence, and it is both accurate and complete         "

## 2019-04-03 NOTE — PATIENT INSTRUCTIONS
No visits with results within 2 Week(s) from this visit.   Latest known visit with results is:   Results Encounter on 11/23/2018   Component Date Value Ref Range Status   • WBC 03/22/2019 4.79  3.40 - 10.80 10*3/mm3 Final   • RBC 03/22/2019 4.38  3.77 - 5.28 10*6/mm3 Final   • Hemoglobin 03/22/2019 13.7  12.0 - 15.9 g/dL Final   • Hematocrit 03/22/2019 40.7  34.0 - 46.6 % Final   • MCV 03/22/2019 92.9  79.0 - 97.0 fL Final   • MCH 03/22/2019 31.3  26.6 - 33.0 pg Final   • MCHC 03/22/2019 33.7  31.5 - 35.7 g/dL Final   • RDW 03/22/2019 12.8  12.3 - 15.4 % Final   • Platelets 03/22/2019 261  140 - 450 10*3/mm3 Final   • Neutrophil Rel % 03/22/2019 69.1  42.7 - 76.0 % Final   • Lymphocyte Rel % 03/22/2019 21.7  19.6 - 45.3 % Final   • Monocyte Rel % 03/22/2019 4.0* 5.0 - 12.0 % Final   • Eosinophil Rel % 03/22/2019 4.4  0.3 - 6.2 % Final   • Basophil Rel % 03/22/2019 0.6  0.0 - 1.5 % Final   • Neutrophils Absolute 03/22/2019 3.31  1.40 - 7.00 10*3/mm3 Final   • Lymphocytes Absolute 03/22/2019 1.04  0.70 - 3.10 10*3/mm3 Final   • Monocytes Absolute 03/22/2019 0.19  0.10 - 0.90 10*3/mm3 Final   • Eosinophils Absolute 03/22/2019 0.21  0.00 - 0.40 10*3/mm3 Final   • Basophils Absolute 03/22/2019 0.03  0.00 - 0.20 10*3/mm3 Final   • Immature Granulocyte Rel % 03/22/2019 0.2  0.0 - 0.5 % Final   • Immature Grans Absolute 03/22/2019 0.01  0.00 - 0.05 10*3/mm3 Final   • nRBC 03/22/2019 0.0  0.0 - 0.0 /100 WBC Final   • Glucose 03/22/2019 84  65 - 99 mg/dL Final   • BUN 03/22/2019 13  8 - 23 mg/dL Final   • Creatinine 03/22/2019 0.79  0.57 - 1.00 mg/dL Final   • eGFR Non African Am 03/22/2019 72  >60 mL/min/1.73 Final   • eGFR African Am 03/22/2019 87  >60 mL/min/1.73 Final   • BUN/Creatinine Ratio 03/22/2019 16.5  7.0 - 25.0 Final   • Sodium 03/22/2019 143  136 - 145 mmol/L Final   • Potassium 03/22/2019 4.1  3.5 - 5.2 mmol/L Final   • Chloride 03/22/2019 105  98 - 107 mmol/L Final   • Total CO2 03/22/2019 24.3  22.0 - 29.0  "mmol/L Final   • Calcium 03/22/2019 9.1  8.6 - 10.5 mg/dL Final   • Total Protein 03/22/2019 6.5  6.0 - 8.5 g/dL Final   • Albumin 03/22/2019 4.10  3.50 - 5.20 g/dL Final   • Globulin 03/22/2019 2.4  gm/dL Final   • A/G Ratio 03/22/2019 1.7  g/dL Final   • Total Bilirubin 03/22/2019 0.3  0.2 - 1.2 mg/dL Final   • Alkaline Phosphatase 03/22/2019 66  39 - 117 U/L Final   • AST (SGOT) 03/22/2019 14  1 - 32 U/L Final   • ALT (SGPT) 03/22/2019 9  1 - 33 U/L Final   • Total Cholesterol 03/22/2019 155  0 - 200 mg/dL Final   • Triglycerides 03/22/2019 67  0 - 150 mg/dL Final   • HDL Cholesterol 03/22/2019 49  40 - 60 mg/dL Final   • VLDL Cholesterol 03/22/2019 13.4  5 - 40 mg/dL Final   • LDL Cholesterol  03/22/2019 93  0 - 100 mg/dL Final   • TSH 03/22/2019 2.450  0.270 - 4.200 mIU/mL Final   • 25 Hydroxy, Vitamin D 03/22/2019 44.0  30.0 - 100.0 ng/ml Final    Comment: Reference Range for Total Vitamin D 25(OH)  Deficiency <20.0 ng/mL  Insufficiency 21-29 ng/mL  Sufficiency  ng/mL  Toxicity >100 ng/ml     • One Specimen Identifier 03/22/2019 Comment   Final    Comment: The specimen received included only one patient identifier on the  primary collection container.  Our laboratory accrediting agency  states \"All primary specimen containers must be labeled with 2  identifiers at the time of collection.\"         "

## 2019-04-04 PROBLEM — M85.80 OSTEOPENIA: Status: ACTIVE | Noted: 2019-04-04

## 2019-04-05 ENCOUNTER — TRANSCRIBE ORDERS (OUTPATIENT)
Dept: ADMINISTRATIVE | Facility: HOSPITAL | Age: 69
End: 2019-04-05

## 2019-04-05 DIAGNOSIS — Z12.31 VISIT FOR SCREENING MAMMOGRAM: Primary | ICD-10-CM

## 2019-05-03 ENCOUNTER — HOSPITAL ENCOUNTER (OUTPATIENT)
Dept: MAMMOGRAPHY | Facility: HOSPITAL | Age: 69
Discharge: HOME | End: 2019-05-03
Admitting: FAMILY MEDICINE

## 2019-05-03 ENCOUNTER — HOSPITAL ENCOUNTER (OUTPATIENT)
Dept: BONE DENSITY | Facility: HOSPITAL | Age: 69
Discharge: HOME | End: 2019-05-03

## 2019-05-03 DIAGNOSIS — Z12.31 VISIT FOR SCREENING MAMMOGRAM: ICD-10-CM

## 2019-05-03 PROCEDURE — 77067 SCR MAMMO BI INCL CAD: CPT

## 2019-05-03 PROCEDURE — 77063 BREAST TOMOSYNTHESIS BI: CPT

## 2019-05-03 PROCEDURE — 77080 DXA BONE DENSITY AXIAL: CPT

## 2019-05-28 ENCOUNTER — TELEPHONE (OUTPATIENT)
Dept: FAMILY MEDICINE CLINIC | Facility: CLINIC | Age: 69
End: 2019-05-28

## 2019-05-28 NOTE — TELEPHONE ENCOUNTER
Britt this pt called and said medicare won't pay for the cbc cmp and lipid panel with the physical code . They never do and her labs are 300.00 she was wanting to know if you could change the coding since you used the z00.00 which isn't covered.    Thanks

## 2019-05-28 NOTE — TELEPHONE ENCOUNTER
I used Routine Adult Health Maintenance.  She has no other diagnosis, except Hypothyroidism and Vit D Def.  I am not sure what else I would change it too.

## 2019-07-02 RX ORDER — CYCLOBENZAPRINE HCL 10 MG
10 TABLET ORAL 3 TIMES DAILY PRN
Qty: 90 TABLET | Refills: 0 | Status: SHIPPED | OUTPATIENT
Start: 2019-07-02 | End: 2019-08-17 | Stop reason: SDUPTHER

## 2019-08-19 RX ORDER — CYCLOBENZAPRINE HCL 10 MG
10 TABLET ORAL 3 TIMES DAILY PRN
Qty: 90 TABLET | Refills: 0 | Status: SHIPPED | OUTPATIENT
Start: 2019-08-19 | End: 2020-02-11 | Stop reason: SDUPTHER

## 2019-09-13 DIAGNOSIS — E03.9 HYPOTHYROIDISM (ACQUIRED): ICD-10-CM

## 2019-09-13 RX ORDER — LEVOTHYROXINE SODIUM 0.05 MG/1
TABLET ORAL
Qty: 90 TABLET | Refills: 1 | Status: SHIPPED | OUTPATIENT
Start: 2019-09-13 | End: 2020-03-09

## 2019-09-20 ENCOUNTER — TELEPHONE (OUTPATIENT)
Dept: FAMILY MEDICINE CLINIC | Facility: CLINIC | Age: 69
End: 2019-09-20

## 2019-09-20 NOTE — TELEPHONE ENCOUNTER
Returned pts call regarding labs and f/u appointment. Pt stated she thought she had an appointment with Dr. Cho on 10/3/19. I explained that she does not have another appointment until 4/320 for labs and then 4/9/20 to see PCP.

## 2020-02-11 ENCOUNTER — OFFICE VISIT (OUTPATIENT)
Dept: FAMILY MEDICINE CLINIC | Facility: CLINIC | Age: 70
End: 2020-02-11

## 2020-02-11 VITALS
SYSTOLIC BLOOD PRESSURE: 138 MMHG | WEIGHT: 161.2 LBS | OXYGEN SATURATION: 99 % | HEART RATE: 89 BPM | TEMPERATURE: 97.9 F | HEIGHT: 61 IN | BODY MASS INDEX: 30.43 KG/M2 | DIASTOLIC BLOOD PRESSURE: 60 MMHG

## 2020-02-11 DIAGNOSIS — J01.00 ACUTE NON-RECURRENT MAXILLARY SINUSITIS: Primary | ICD-10-CM

## 2020-02-11 DIAGNOSIS — M26.609 TMJ (TEMPOROMANDIBULAR JOINT DISORDER): ICD-10-CM

## 2020-02-11 PROCEDURE — 99213 OFFICE O/P EST LOW 20 MIN: CPT | Performed by: FAMILY MEDICINE

## 2020-02-11 RX ORDER — PREDNISONE 20 MG/1
40 TABLET ORAL DAILY
Qty: 10 TABLET | Refills: 0 | Status: SHIPPED | OUTPATIENT
Start: 2020-02-11 | End: 2020-02-16

## 2020-02-11 RX ORDER — CYCLOBENZAPRINE HCL 10 MG
10 TABLET ORAL 3 TIMES DAILY PRN
Qty: 90 TABLET | Refills: 0 | Status: SHIPPED | OUTPATIENT
Start: 2020-02-11 | End: 2020-03-09

## 2020-02-11 RX ORDER — AMOXICILLIN AND CLAVULANATE POTASSIUM 875; 125 MG/1; MG/1
1 TABLET, FILM COATED ORAL 2 TIMES DAILY
Qty: 20 TABLET | Refills: 0 | Status: SHIPPED | OUTPATIENT
Start: 2020-02-11 | End: 2020-02-21

## 2020-02-11 NOTE — PROGRESS NOTES
"  Chief Complaint   Patient presents with   • Sinusitis   • Cough     chest pain       Upper Respiratory Infection: Patient complains of possible sinusitis. Symptoms include swollen glands. Onset of symptoms was 7 days ago, unchanged since that time. She also c/o sinus pressure for the past 7 days .  She is drinking moderate amounts of fluids. Evaluation to date: none. Treatment to date: none.    Reports sinusitis that has been going on for over 7 days with pressure in the maxillary area.  She has been doing Flonase but has not been helping at all.  Also been taking Mucinex without relief.  Also with some low-grade fever and a cough.  Denies body aches and says she does not think she has the flu and has not had any known flu exposures    Ill contacts at home or school or work discussed.    Review of Systems   Constitutional: Positive for fever.   HENT: Positive for congestion and sinus pressure.    Respiratory: Positive for shortness of breath. Negative for chest tightness and wheezing.    Cardiovascular: Negative for chest pain.   Gastrointestinal: Negative for abdominal pain.         Vitals:    02/11/20 1554   BP: 138/60   Pulse: 89   Temp: 97.9 °F (36.6 °C)   SpO2: 99%   Weight: 73.1 kg (161 lb 3.2 oz)   Height: 154.9 cm (61\")     Gen: mildly ill appearing, alert  Ears: Tm's bulging without redness  Nose:  Congestion  Throat:  Red without exudate, some drainage, tonsils okay  Neck: no LAD  Lung: mild rales, good air movement, regular RR  Heart: RR without murmur  Skin: no rash.      Assessment/Plan   Fatoumata was seen today for sinusitis and cough.    Diagnoses and all orders for this visit:    Acute non-recurrent maxillary sinusitis  -     amoxicillin-clavulanate (AUGMENTIN) 875-125 MG per tablet; Take 1 tablet by mouth 2 (Two) Times a Day for 10 days.  -     predniSONE (DELTASONE) 20 MG tablet; Take 2 tablets by mouth Daily for 5 days.    TMJ (temporomandibular joint disorder)  -     cyclobenzaprine (FLEXERIL) " 10 MG tablet; Take 1 tablet by mouth 3 (Three) Times a Day As Needed for Muscle Spasms.      Will treat for sinusitis.  She needs to continue conservative care and increase hydration         Tylenol or Advil as needed for pain, fever, muscle aches  Plenty of fluids  Hand washing discussed  Off work or school note given if needed.  Warm tea for throat.  Pros and cons of antibiotic use discussed    Dr. Gem Agosto MD  Family Practice  [place], [place]  [facility]

## 2020-03-07 DIAGNOSIS — M26.609 TMJ (TEMPOROMANDIBULAR JOINT DISORDER): ICD-10-CM

## 2020-03-09 DIAGNOSIS — E03.9 HYPOTHYROIDISM (ACQUIRED): ICD-10-CM

## 2020-03-09 RX ORDER — CYCLOBENZAPRINE HCL 10 MG
10 TABLET ORAL 3 TIMES DAILY PRN
Qty: 90 TABLET | Refills: 0 | Status: SHIPPED | OUTPATIENT
Start: 2020-03-09 | End: 2020-04-15

## 2020-03-09 RX ORDER — LEVOTHYROXINE SODIUM 0.05 MG/1
TABLET ORAL
Qty: 90 TABLET | Refills: 1 | Status: SHIPPED | OUTPATIENT
Start: 2020-03-09 | End: 2020-09-02

## 2020-03-19 ENCOUNTER — TELEPHONE (OUTPATIENT)
Dept: FAMILY MEDICINE CLINIC | Facility: CLINIC | Age: 70
End: 2020-03-19

## 2020-03-19 NOTE — TELEPHONE ENCOUNTER
Pt states she has Quarantined herself for a week and is afraid to go to work.  She is asking you give her a note to be off.  She is not ill at this time, just afraid of the virus spreading.

## 2020-04-15 DIAGNOSIS — M26.609 TMJ (TEMPOROMANDIBULAR JOINT DISORDER): ICD-10-CM

## 2020-04-15 RX ORDER — CYCLOBENZAPRINE HCL 10 MG
10 TABLET ORAL 3 TIMES DAILY PRN
Qty: 90 TABLET | Refills: 0 | Status: SHIPPED | OUTPATIENT
Start: 2020-04-15 | End: 2020-05-18

## 2020-05-16 DIAGNOSIS — M26.609 TMJ (TEMPOROMANDIBULAR JOINT DISORDER): ICD-10-CM

## 2020-05-18 RX ORDER — CYCLOBENZAPRINE HCL 10 MG
10 TABLET ORAL 3 TIMES DAILY PRN
Qty: 90 TABLET | Refills: 0 | Status: SHIPPED | OUTPATIENT
Start: 2020-05-18 | End: 2020-06-15 | Stop reason: SDUPTHER

## 2020-06-11 DIAGNOSIS — E55.9 VITAMIN D DEFICIENCY: ICD-10-CM

## 2020-06-11 DIAGNOSIS — E78.2 MIXED HYPERLIPIDEMIA: Primary | ICD-10-CM

## 2020-06-11 DIAGNOSIS — E03.9 HYPOTHYROIDISM (ACQUIRED): ICD-10-CM

## 2020-06-13 LAB
25(OH)D3+25(OH)D2 SERPL-MCNC: 56.4 NG/ML (ref 30–100)
ALBUMIN SERPL-MCNC: 4.4 G/DL (ref 3.8–4.8)
ALBUMIN/GLOB SERPL: 1.9 {RATIO} (ref 1.2–2.2)
ALP SERPL-CCNC: 70 IU/L (ref 39–117)
ALT SERPL-CCNC: 13 IU/L (ref 0–32)
AST SERPL-CCNC: 19 IU/L (ref 0–40)
BASOPHILS # BLD AUTO: 0 X10E3/UL (ref 0–0.2)
BASOPHILS NFR BLD AUTO: 1 %
BILIRUB SERPL-MCNC: 0.6 MG/DL (ref 0–1.2)
BUN SERPL-MCNC: 13 MG/DL (ref 8–27)
BUN/CREAT SERPL: 16 (ref 12–28)
CALCIUM SERPL-MCNC: 9.2 MG/DL (ref 8.7–10.3)
CHLORIDE SERPL-SCNC: 108 MMOL/L (ref 96–106)
CHOLEST SERPL-MCNC: 204 MG/DL (ref 100–199)
CO2 SERPL-SCNC: 24 MMOL/L (ref 20–29)
CREAT SERPL-MCNC: 0.82 MG/DL (ref 0.57–1)
EOSINOPHIL # BLD AUTO: 0.1 X10E3/UL (ref 0–0.4)
EOSINOPHIL NFR BLD AUTO: 3 %
ERYTHROCYTE [DISTWIDTH] IN BLOOD BY AUTOMATED COUNT: 13.5 % (ref 11.7–15.4)
GLOBULIN SER CALC-MCNC: 2.3 G/DL (ref 1.5–4.5)
GLUCOSE SERPL-MCNC: 90 MG/DL (ref 65–99)
HCT VFR BLD AUTO: 41.3 % (ref 34–46.6)
HDLC SERPL-MCNC: 54 MG/DL
HGB BLD-MCNC: 13.8 G/DL (ref 11.1–15.9)
IMM GRANULOCYTES # BLD AUTO: 0 X10E3/UL (ref 0–0.1)
IMM GRANULOCYTES NFR BLD AUTO: 0 %
LDLC SERPL CALC-MCNC: 124 MG/DL (ref 0–99)
LYMPHOCYTES # BLD AUTO: 1.2 X10E3/UL (ref 0.7–3.1)
LYMPHOCYTES NFR BLD AUTO: 32 %
MCH RBC QN AUTO: 30.8 PG (ref 26.6–33)
MCHC RBC AUTO-ENTMCNC: 33.4 G/DL (ref 31.5–35.7)
MCV RBC AUTO: 92 FL (ref 79–97)
MONOCYTES # BLD AUTO: 0.2 X10E3/UL (ref 0.1–0.9)
MONOCYTES NFR BLD AUTO: 5 %
NEUTROPHILS # BLD AUTO: 2.3 X10E3/UL (ref 1.4–7)
NEUTROPHILS NFR BLD AUTO: 59 %
PLATELET # BLD AUTO: 252 X10E3/UL (ref 150–450)
POTASSIUM SERPL-SCNC: 4 MMOL/L (ref 3.5–5.2)
PROT SERPL-MCNC: 6.7 G/DL (ref 6–8.5)
RBC # BLD AUTO: 4.48 X10E6/UL (ref 3.77–5.28)
SODIUM SERPL-SCNC: 145 MMOL/L (ref 134–144)
TRIGL SERPL-MCNC: 130 MG/DL (ref 0–149)
TSH SERPL DL<=0.005 MIU/L-ACNC: 1.13 UIU/ML (ref 0.45–4.5)
VLDLC SERPL CALC-MCNC: 26 MG/DL (ref 5–40)
WBC # BLD AUTO: 3.8 X10E3/UL (ref 3.4–10.8)

## 2020-06-15 ENCOUNTER — OFFICE VISIT (OUTPATIENT)
Dept: FAMILY MEDICINE CLINIC | Facility: CLINIC | Age: 70
End: 2020-06-15

## 2020-06-15 VITALS
WEIGHT: 153 LBS | DIASTOLIC BLOOD PRESSURE: 72 MMHG | OXYGEN SATURATION: 98 % | TEMPERATURE: 98.2 F | HEART RATE: 83 BPM | HEIGHT: 61 IN | SYSTOLIC BLOOD PRESSURE: 122 MMHG | BODY MASS INDEX: 28.89 KG/M2

## 2020-06-15 DIAGNOSIS — Z12.39 ENCOUNTER FOR SCREENING FOR MALIGNANT NEOPLASM OF BREAST: ICD-10-CM

## 2020-06-15 DIAGNOSIS — Z12.31 ENCOUNTER FOR SCREENING MAMMOGRAM FOR MALIGNANT NEOPLASM OF BREAST: ICD-10-CM

## 2020-06-15 DIAGNOSIS — M26.609 TMJ (TEMPOROMANDIBULAR JOINT DISORDER): ICD-10-CM

## 2020-06-15 DIAGNOSIS — E03.9 HYPOTHYROIDISM (ACQUIRED): ICD-10-CM

## 2020-06-15 DIAGNOSIS — E78.2 MIXED HYPERLIPIDEMIA: ICD-10-CM

## 2020-06-15 DIAGNOSIS — M54.59 MECHANICAL LOW BACK PAIN: ICD-10-CM

## 2020-06-15 DIAGNOSIS — E55.9 AVITAMINOSIS D: ICD-10-CM

## 2020-06-15 DIAGNOSIS — Z00.00 MEDICARE ANNUAL WELLNESS VISIT, SUBSEQUENT: Primary | ICD-10-CM

## 2020-06-15 PROCEDURE — G0439 PPPS, SUBSEQ VISIT: HCPCS | Performed by: FAMILY MEDICINE

## 2020-06-15 PROCEDURE — 99213 OFFICE O/P EST LOW 20 MIN: CPT | Performed by: FAMILY MEDICINE

## 2020-06-15 RX ORDER — CHLORHEXIDINE GLUCONATE 0.12 MG/ML
RINSE ORAL SEE ADMIN INSTRUCTIONS
COMMUNITY
Start: 2020-05-20 | End: 2021-06-21

## 2020-06-15 RX ORDER — CYCLOBENZAPRINE HCL 10 MG
10 TABLET ORAL 3 TIMES DAILY PRN
Qty: 90 TABLET | Refills: 1 | Status: SHIPPED | OUTPATIENT
Start: 2020-06-15 | End: 2020-06-19 | Stop reason: SDUPTHER

## 2020-06-15 NOTE — PROGRESS NOTES
QUICK REFERENCE INFORMATION:  The ABCs of Providing the Annual Wellness Visit   CMS.[facility]    Medicare Subsequent Wellness Visit      Subjective   History of Present Illness    Fatoumata Perry is a 70 y.o. female who presents for an Subsequent Wellness Visit. In addition, we addressed the following health issues: 70-year-old female here for subsequent Medicare wellness visit as well as review of hypothyroidism, vitamin D deficiency as well as recurrent low back pain.  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  With coronavirus she has taken some time off.  Fasting labs have been acquired prior to this visit and she is here for review and further medical management of the above.  Current medications include levothyroxine at 50 mcg daily.  She also uses Flexeril on an intermittent basis for her low back issues.  She is currently doing well and has no acute complaints.  Last mammogram was over 1 year ago and was found to be normal.  Patient with a sister with breast cancer and therefore in need of a another mammogram.  Her last DEXA scan was in April of last year with only mild osteopenia upper hips-T score of -1.3.  Her lumbar spine showed normal bone.  Last colonoscopy has been within the last 3 to 4 years.  She is not due for another 4 to 5 years.  Patient is status post [facility] and therefore is no longer in a position to have Pap smears.      PMH, PSH, SocHx, FamHx, Allergies, and Medications: Reviewed and updated in the Visit Navigator.     Outpatient Medications Prior to Visit   Medication Sig Dispense Refill   • chlorhexidine (PERIDEX) 0.12 % solution See Admin Instructions.     • levothyroxine (SYNTHROID, LEVOTHROID) 50 MCG tablet TAKE 1 TABLET BY MOUTH EVERY DAY 90 tablet 1   • cyclobenzaprine (FLEXERIL) 10 MG tablet TAKE 1 TABLET BY MOUTH 3 (THREE) TIMES A DAY AS NEEDED FOR MUSCLE SPASMS. 90 tablet 0     No facility-administered medications  prior to visit.        Patient Active Problem List   Diagnosis   • Avitaminosis D   • Postherpetic neuralgia   • Shingles outbreak   • Hypothyroidism (acquired)   • Osteopenia   • Mechanical low back pain   • Mixed hyperlipidemia       Health Habits:  Dental Exam. up to date  Eye Exam. unknown  Exercise: 7 times/week.  Current exercise activities include: walking    Social:  See review in SnapShot activity and in SocHx section of Visit Navigator.    Health Risk Assessment:  The patient has completed a Health Risk Assessment. This has been reviewed with them and has been scanned into OnBase as a separate document.    Current Medical Providers:  Patient Care Team:  Cosme Cho DO as PCP - General  Armas, Shan Haider MD as PCP - Claims Attributed    The [facility] providers who are involved in the care of this patient are listed above. Additional providers and suppliers are listed below:  none    Recent Hospitalizations:  No recent hospitalization(s)..    Age-appropriate Screening Schedule:  Refer to the list below for future screening recommendations based on patient's age. Orders for these recommended tests are listed in the plan section. The patient has been provided with a written plan.    Health Maintenance   Topic Date Due   • HEPATITIS C SCREENING  05/02/2016   • ZOSTER VACCINE (2 of 3) 10/07/2016   • MEDICARE ANNUAL WELLNESS  04/03/2020   • INFLUENZA VACCINE  08/01/2020   • MAMMOGRAM  05/03/2021   • DXA SCAN  05/03/2021   • LIPID PANEL  06/12/2021   • COLONOSCOPY  05/04/2025   • TDAP/TD VACCINES (2 - Td) 09/10/2027   • Pneumococcal Vaccine Once at 65 Years Old  Completed       Depression Screen:   PHQ-2/PHQ-9 Depression Screening 6/15/2020   Little interest or pleasure in doing things 0   Feeling down, depressed, or hopeless 0   Trouble falling or staying asleep, or sleeping too much -   Feeling tired or having little energy -   Poor appetite or overeating -   Feeling bad about yourself - or that you  are a failure or have let yourself or your family down -   Trouble concentrating on things, such as reading the newspaper or watching television -   Moving or speaking so slowly that other people could have noticed. Or the opposite - being so fidgety or restless that you have been moving around a lot more than usual -   Thoughts that you would be better off dead, or of hurting yourself in some way -   Total Score 0       Functional and Cognitive Screening:  Functional & Cognitive Status 6/15/2020   Do you have difficulty preparing food and eating? No   Do you have difficulty bathing yourself, getting dressed or grooming yourself? No   Do you have difficulty using the toilet? No   Do you have difficulty moving around from place to place? No   Do you have trouble with steps or getting out of a bed or a chair? No   Current Diet Well Balanced Diet   Dental Exam Up to date   Eye Exam Not up to date   Exercise (times per week) 7 times per week   Current Exercise Activities Include Walking   Do you need help using the phone?  No   Are you deaf or do you have serious difficulty hearing?  No   Do you need help with transportation? No   Do you need help shopping? No   Do you need help preparing meals?  No   Do you need help with housework?  No   Do you need help with laundry? No   Do you need help taking your medications? No   Do you need help managing money? No   Do you ever drive or ride in a car without wearing a seat belt? No   Have you felt unusual stress, anger or loneliness in the last month? No   Who do you live with? Alone   If you need help, do you have trouble finding someone available to you? No   Have you been bothered in the last four weeks by sexual problems? -   Do you have difficulty concentrating, remembering or making decisions? No       Does the patient have evidence of cognitive impairment? No    Identification of Risk Factors:  Risk factors include: Advance Directive Discussion  Breast Cancer/Mammogram  "Screening  Cardiovascular risk  Colon Cancer Screening  Dementia/Memory   Diabetic Lab Screening   Fall Risk  Immunizations Discussed/Encouraged (specific immunizations; Influenza and Shingrix )  Osteoprorosis Risk  Urinary Incontinence.    Review of Systems   Endocrine: Negative for cold intolerance and heat intolerance.        Hypothyroidism   Musculoskeletal: Positive for back pain.   All other systems reviewed and are negative.      Pertinent items are noted in HPI.    Objective     Vitals:    06/15/20 1039   BP: 122/72   Pulse: 83   Temp: 98.2 °F (36.8 °C)   SpO2: 98%   Weight: 69.4 kg (153 lb)   Height: 154.9 cm (61\")   Physical Exam   Constitutional: She is oriented to person, place, and time. She appears well-developed and well-nourished.   HENT:   Head: Normocephalic and atraumatic.   Neck: Trachea normal and phonation normal. Neck supple. Normal carotid pulses present. Carotid bruit is not present. No thyroid mass and no thyromegaly present.   Cardiovascular: Normal rate, regular rhythm and normal heart sounds. Exam reveals no gallop and no friction rub.   No murmur heard.  Pulmonary/Chest: Effort normal and breath sounds normal. No respiratory distress. She has no decreased breath sounds. She has no wheezes. She has no rhonchi. She has no rales.   Lymphadenopathy:     She has no cervical adenopathy.   Neurological: She is alert and oriented to person, place, and time.   Skin: Skin is warm and dry. No rash noted.   Psychiatric: She has a normal mood and affect. Her speech is normal and behavior is normal. Judgment and thought content normal. Cognition and memory are normal.   Nursing note and vitals reviewed.    Orders Only on 06/11/2020   Component Date Value Ref Range Status   • WBC 06/12/2020 3.8  3.4 - 10.8 x10E3/uL Final   • RBC 06/12/2020 4.48  3.77 - 5.28 x10E6/uL Final   • Hemoglobin 06/12/2020 13.8  11.1 - 15.9 g/dL Final   • Hematocrit 06/12/2020 41.3  34.0 - 46.6 % Final   • MCV 06/12/2020 92  " 79 - 97 fL Final   • MCH 06/12/2020 30.8  26.6 - 33.0 pg Final   • MCHC 06/12/2020 33.4  31.5 - 35.7 g/dL Final   • RDW 06/12/2020 13.5  11.7 - 15.4 % Final   • Platelets 06/12/2020 252  150 - 450 x10E3/uL Final   • Neutrophil Rel % 06/12/2020 59  Not Estab. % Final   • Lymphocyte Rel % 06/12/2020 32  Not Estab. % Final   • Monocyte Rel % 06/12/2020 5  Not Estab. % Final   • Eosinophil Rel % 06/12/2020 3  Not Estab. % Final   • Basophil Rel % 06/12/2020 1  Not Estab. % Final   • Neutrophils Absolute 06/12/2020 2.3  1.4 - 7.0 x10E3/uL Final   • Lymphocytes Absolute 06/12/2020 1.2  0.7 - 3.1 x10E3/uL Final   • Monocytes Absolute 06/12/2020 0.2  0.1 - 0.9 x10E3/uL Final   • Eosinophils Absolute 06/12/2020 0.1  0.0 - 0.4 x10E3/uL Final   • Basophils Absolute 06/12/2020 0.0  0.0 - 0.2 x10E3/uL Final   • Immature Granulocyte Rel % 06/12/2020 0  Not Estab. % Final   • Immature Grans Absolute 06/12/2020 0.0  0.0 - 0.1 x10E3/uL Final   • Glucose 06/12/2020 90  65 - 99 mg/dL Final   • BUN 06/12/2020 13  8 - 27 mg/dL Final   • Creatinine 06/12/2020 0.82  0.57 - 1.00 mg/dL Final   • eGFR Non African Am 06/12/2020 73  >59 mL/min/1.73 Final   • eGFR African Am 06/12/2020 84  >59 mL/min/1.73 Final   • BUN/Creatinine Ratio 06/12/2020 16  12 - 28 Final   • Sodium 06/12/2020 145* 134 - 144 mmol/L Final   • Potassium 06/12/2020 4.0  3.5 - 5.2 mmol/L Final   • Chloride 06/12/2020 108* 96 - 106 mmol/L Final   • Total CO2 06/12/2020 24  20 - 29 mmol/L Final   • Calcium 06/12/2020 9.2  8.7 - 10.3 mg/dL Final   • Total Protein 06/12/2020 6.7  6.0 - 8.5 g/dL Final   • Albumin 06/12/2020 4.4  3.8 - 4.8 g/dL Final   • Globulin 06/12/2020 2.3  1.5 - 4.5 g/dL Final   • A/G Ratio 06/12/2020 1.9  1.2 - 2.2 Final   • Total Bilirubin 06/12/2020 0.6  0.0 - 1.2 mg/dL Final   • Alkaline Phosphatase 06/12/2020 70  39 - 117 IU/L Final   • AST (SGOT) 06/12/2020 19  0 - 40 IU/L Final   • ALT (SGPT) 06/12/2020 13  0 - 32 IU/L Final   • Total Cholesterol  06/12/2020 204* 100 - 199 mg/dL Final   • Triglycerides 06/12/2020 130  0 - 149 mg/dL Final   • HDL Cholesterol 06/12/2020 54  >39 mg/dL Final   • VLDL Cholesterol 06/12/2020 26  5 - 40 mg/dL Final   • LDL Cholesterol  06/12/2020 124* 0 - 99 mg/dL Final   • TSH 06/12/2020 1.130  0.450 - 4.500 uIU/mL Final   • 25 Hydroxy, Vitamin D 06/12/2020 56.4  30.0 - 100.0 ng/mL Final    Comment: Vitamin D deficiency has been defined by the [place] of  Medicine and an Endocrine Society practice guideline as a  level of serum 25-OH vitamin D less than 20 ng/mL (1,2).  The Endocrine Society went on to further define vitamin D  insufficiency as a level between 21 and 29 ng/mL (2).  1. IOM ([place] of [employer]). 2010. Dietary reference     intakes for calcium and D. [place]: The     National Academies Press.  2. Daniel MF, Tamy URRUTIA, Cony TOWNSEND, et al.     Evaluation, treatment, and prevention of vitamin D     deficiency: an Endocrine Society clinical practice     guideline. JCEM. 2011 Jul; 96(7):1911-30.           Body mass index is 28.91 kg/m².    Assessment/Plan   Patient Self-Management and Personalized Health Advice  The patient has been provided with information about: diet, exercise, weight management and prevention of cardiac or vascular disease and preventive services including:   · Annual Wellness Visit (AWV)  · Bone Density Measurements  · Cardiovascular Disease Screening Tests (may do this order every 5 years in beneficiaries without signs or symptoms of cardiovascular disease)  · Colorectal Cancer Screening, Colonoscopy  · Diabetes Screening-Lab Order for either glucose quantitative blood (except reagent strip), glucose;post glucose dose(includes glucose), or glucose tolerance test-3 specimens(includes glucose)  · Influenza Vaccine and Administration  · Screening Mammography .    Discussed the patient's BMI with her. The BMI is in the acceptable range.    Orders:  Orders Placed This Encounter    Procedures   • Mammo Screening Bilateral With CAD   • Lipid panel   • Comprehensive metabolic panel   • Vitamin D 25 hydroxy   • TSH   • CBC w AUTO Differential       Follow Up:  Return in about 6 months (around 12/15/2020) for Recheck.     An After Visit Summary and PPPS with all of these plans were given to the patient.

## 2020-06-19 ENCOUNTER — HOSPITAL ENCOUNTER (OUTPATIENT)
Dept: MAMMOGRAPHY | Facility: HOSPITAL | Age: 70
Discharge: HOME | End: 2020-06-19
Admitting: FAMILY MEDICINE

## 2020-06-19 ENCOUNTER — TELEPHONE (OUTPATIENT)
Dept: FAMILY MEDICINE CLINIC | Facility: CLINIC | Age: 70
End: 2020-06-19

## 2020-06-19 DIAGNOSIS — Z12.31 ENCOUNTER FOR SCREENING MAMMOGRAM FOR MALIGNANT NEOPLASM OF BREAST: ICD-10-CM

## 2020-06-19 DIAGNOSIS — M26.609 TMJ (TEMPOROMANDIBULAR JOINT DISORDER): ICD-10-CM

## 2020-06-19 DIAGNOSIS — Z12.39 ENCOUNTER FOR SCREENING FOR MALIGNANT NEOPLASM OF BREAST: ICD-10-CM

## 2020-06-19 PROCEDURE — 77067 SCR MAMMO BI INCL CAD: CPT

## 2020-06-19 PROCEDURE — 77063 BREAST TOMOSYNTHESIS BI: CPT

## 2020-06-19 RX ORDER — CYCLOBENZAPRINE HCL 10 MG
10 TABLET ORAL 3 TIMES DAILY PRN
Qty: 270 TABLET | Refills: 0 | Status: SHIPPED | OUTPATIENT
Start: 2020-06-19 | End: 2021-12-20 | Stop reason: SDUPTHER

## 2020-06-19 NOTE — TELEPHONE ENCOUNTER
Pt requests the Flexeril be sent for a 90 day supply instead of the 30 day.  They both cost the same.  She cancelled the 30 day script and didn't pick it up.

## 2020-09-02 DIAGNOSIS — E03.9 HYPOTHYROIDISM (ACQUIRED): ICD-10-CM

## 2020-09-02 RX ORDER — LEVOTHYROXINE SODIUM 0.05 MG/1
TABLET ORAL
Qty: 90 TABLET | Refills: 1 | Status: SHIPPED | OUTPATIENT
Start: 2020-09-02 | End: 2021-06-21 | Stop reason: SDUPTHER

## 2021-01-22 DIAGNOSIS — E03.9 HYPOTHYROIDISM (ACQUIRED): ICD-10-CM

## 2021-01-22 RX ORDER — LEVOTHYROXINE SODIUM 0.05 MG/1
TABLET ORAL
Qty: 90 TABLET | Refills: 1 | OUTPATIENT
Start: 2021-01-22

## 2021-02-02 ENCOUNTER — OFFICE VISIT (OUTPATIENT)
Dept: OBSTETRICS AND GYNECOLOGY | Age: 71
End: 2021-02-02

## 2021-02-02 VITALS
HEIGHT: 61 IN | DIASTOLIC BLOOD PRESSURE: 80 MMHG | BODY MASS INDEX: 30.78 KG/M2 | SYSTOLIC BLOOD PRESSURE: 132 MMHG | WEIGHT: 163 LBS

## 2021-02-02 DIAGNOSIS — N95.2 ATROPHIC VAGINITIS: ICD-10-CM

## 2021-02-02 DIAGNOSIS — Z01.419 ENCOUNTER FOR GYNECOLOGICAL EXAMINATION: Primary | ICD-10-CM

## 2021-02-02 DIAGNOSIS — N89.5 NARROWING OR CLOSURE OF VAGINA: ICD-10-CM

## 2021-02-02 PROCEDURE — G0101 CA SCREEN;PELVIC/BREAST EXAM: HCPCS | Performed by: OBSTETRICS & GYNECOLOGY

## 2021-02-02 NOTE — PROGRESS NOTES
"Subjective     Chief Complaint   Patient presents with   • Gynecologic Exam     New gyn:[facility]care,requesting a pap,last mammo 3/20,colonoscopy 2016,ref.Brynn Ruano        History of Present Illness    Fatoumata Perry is a very pleasant 71 y.o. female who presents for annual exam Mammo Exam due next month with her PCP, Exercise 5 times a week walking patient is new to me, she states her daughter made her come in so she can get a Pap smear.  She had a vaginal hysterectomy and bilateral salpingo-oophorectomy many years ago.    She also has a history of atrophic vaginitis and a narrow vaginal vault for which she underwent dilation therapy in the past    She is due for colonoscopy this year and will schedule that with her PCP wellness labs are done there as well.  .      Obstetric History:  OB History        2    Para   2    Term   2            AB        Living           SAB        TAB        Ectopic        Molar        Multiple        Live Births                   Menstrual History:     No LMP recorded. Patient has had a hysterectomy.       Sexual History:       Past Medical History:   Diagnosis Date   • History of shingles    • Hypothyroidism      Past Surgical History:   Procedure Laterality Date   • HYSTERECTOMY     • OOPHORECTOMY           SOCIAL Hx:        The following portions of the patient's history were reviewed and updated as appropriate: allergies, current medications, past family history, past medical history, past social history, past surgical history and problem list.    Review of Systems        Except as outlined in history of physical illness, patient denies any changes in her GYN, , GI systems. All other systems reviewed are negative.         Objective   Physical Exam    /80   Ht 154.9 cm (61\")   Wt 73.9 kg (163 lb)   Breastfeeding No   BMI 30.80 kg/m²     General: Patient is alert and oriented and appears overall healthy  Neck: Is supple without " thyromegaly, no carotid bruits and no lymphadenopathy  Lungs: Clear bilaterally, no wheezing, rhonchi, or rales.  Respiratory rate is normal  Breasts: Symmetrical, no masses, no lymphadenopathy, no erythema, no discharge  Heart: Regular rate and rhythm are appreciated, no murmurs or rubs are heard  Abdomen: Is soft, without organomegaly, bowel sounds are positive, there is no                                rebound or guarding and palpation does not produce any discomfort  Back: Nontender without CVA tenderness  Pelvic: External genitalia appear normal and consistent with mature female.  BUS normal              Urethra and bladder are without any masses or tenderness              Urethral meatus is normal without discharge or masses or tenderness              Bladder is nonenlarged nontender              Vagina is clean dry without discharge and appears adequately estrogenized, no               lesions or masses are present                          Rectal digital exam reveals adequate sphincter tone and no masses or lesions are                     appreciated on digital rectal examination.    Annual Well Woman Exam    Patient Active Problem List   Diagnosis   • Avitaminosis D   • Postherpetic neuralgia   • Shingles outbreak   • Hypothyroidism (acquired)   • Osteopenia   • Mechanical low back pain   • Mixed hyperlipidemia   • Narrowing or closure of vagina   • Atrophic vaginitis                Assessment/Plan   Diagnoses and all orders for this visit:    1. Encounter for gynecological examination (Primary)  -     IGP, Apt HPV,rfx 16 / 18,45    2. Atrophic vaginitis    3. Narrowing or closure of vagina      Discussed today's findings and concerns with patient.  Continue to recommend regular exercise including cardiovascular and resistance training as well as  breast self-exam. Wellness lab, mammography, & pap smear, in accordance with age guidelines.    I have encouraged her to call for today's test results if she has  not received them within 10 days.  Patient is advised to call with any change in her condition or with any other questions, otherwise return  for annual examination.                 EMR Dragon/ Transcription disclaimer:  Much of the encounter note is an electronic transcription/translation of spoken language to printed text. The electronic translation of spoken language may permit erroneous, or at times, nonessential words or phrases to be inadvertently transcribes; Although i have reviewed the note for such errors, some may still exist.

## 2021-02-04 LAB
CYTOLOGIST CVX/VAG CYTO: NORMAL
CYTOLOGY CVX/VAG DOC CYTO: NORMAL
CYTOLOGY CVX/VAG DOC THIN PREP: NORMAL
DX ICD CODE: NORMAL
HIV 1 & 2 AB SER-IMP: NORMAL
HPV I/H RISK 4 DNA CVX QL PROBE+SIG AMP: NEGATIVE
OTHER STN SPEC: NORMAL
STAT OF ADQ CVX/VAG CYTO-IMP: NORMAL

## 2021-06-21 ENCOUNTER — OFFICE VISIT (OUTPATIENT)
Dept: FAMILY MEDICINE CLINIC | Facility: CLINIC | Age: 71
End: 2021-06-21

## 2021-06-21 ENCOUNTER — TELEPHONE (OUTPATIENT)
Dept: FAMILY MEDICINE CLINIC | Facility: CLINIC | Age: 71
End: 2021-06-21

## 2021-06-21 VITALS
WEIGHT: 163 LBS | TEMPERATURE: 97.6 F | OXYGEN SATURATION: 98 % | SYSTOLIC BLOOD PRESSURE: 132 MMHG | BODY MASS INDEX: 30.78 KG/M2 | DIASTOLIC BLOOD PRESSURE: 82 MMHG | HEART RATE: 86 BPM | HEIGHT: 61 IN

## 2021-06-21 DIAGNOSIS — Z12.11 COLON CANCER SCREENING: ICD-10-CM

## 2021-06-21 DIAGNOSIS — Z00.00 MEDICARE ANNUAL WELLNESS VISIT, SUBSEQUENT: Primary | ICD-10-CM

## 2021-06-21 DIAGNOSIS — E55.9 AVITAMINOSIS D: ICD-10-CM

## 2021-06-21 DIAGNOSIS — E78.2 MIXED HYPERLIPIDEMIA: ICD-10-CM

## 2021-06-21 DIAGNOSIS — E03.9 HYPOTHYROIDISM (ACQUIRED): ICD-10-CM

## 2021-06-21 DIAGNOSIS — E87.8 HYPERCHLOREMIA: ICD-10-CM

## 2021-06-21 DIAGNOSIS — E87.0 HYPERNATREMIA: ICD-10-CM

## 2021-06-21 DIAGNOSIS — Z12.31 ENCOUNTER FOR SCREENING MAMMOGRAM FOR MALIGNANT NEOPLASM OF BREAST: ICD-10-CM

## 2021-06-21 PROCEDURE — 99213 OFFICE O/P EST LOW 20 MIN: CPT | Performed by: FAMILY MEDICINE

## 2021-06-21 PROCEDURE — G0439 PPPS, SUBSEQ VISIT: HCPCS | Performed by: FAMILY MEDICINE

## 2021-06-21 RX ORDER — LEVOTHYROXINE SODIUM 0.05 MG/1
50 TABLET ORAL DAILY
Qty: 90 TABLET | Refills: 1 | Status: SHIPPED | OUTPATIENT
Start: 2021-06-21 | End: 2021-12-20 | Stop reason: SDDI

## 2021-06-21 NOTE — TELEPHONE ENCOUNTER
----- Message from Cosme Cho, DO sent at 6/21/2021 11:31 AM EDT -----  Please let patient know that she is due for colonoscopy and the last one was done by Anders Collazo-I have put in a referral for her to have the next 1 done by Dr. Collazo again.

## 2021-06-21 NOTE — PROGRESS NOTES
QUICK REFERENCE INFORMATION:  The ABCs of Providing the Annual Wellness Visit   CMS.[employer] Medicare Subsequent Wellness Visit      Subjective   History of Present Illness    Fatoumata Perry is a 71 y.o. female who presents for an Subsequent Wellness Visit. In addition, we addressed the following health issues: 71-year-old white female with history of hypothyroidism here for further medical management.  She also has history of hyperlipidemia and hyper natremia.  Medications include levothyroxine at 50 mcg daily with a very rare use of Flexeril as a muscle relaxer.  Fasting labs have been obtained prior to this visit.  She has been trying to manage her lipid status with dietary measures alone.  She has no idea why her sodium and chloride are elevated.  Last mammogram was 1 year ago and she is willing to schedule the next one.  She is status post [facility] and no longer requires Pap smears.  Last bone density was performed in 2019 with minimal osteopiña.  Next DEXA scan will be due in 2024.  Last colonoscopy was thought to be 5 years ago with benign polyps.  This will need to be verified in her chart.  Overall she is doing well and has no acute complaints.      PMH, PSH, SocHx, FamHx, Allergies, and Medications: Reviewed and updated in the Visit Navigator.     Outpatient Medications Prior to Visit   Medication Sig Dispense Refill   • cyclobenzaprine (FLEXERIL) 10 MG tablet Take 1 tablet by mouth 3 (Three) Times a Day As Needed for Muscle Spasms. 270 tablet 0   • levothyroxine (SYNTHROID, LEVOTHROID) 50 MCG tablet TAKE 1 TABLET BY MOUTH EVERY DAY 90 tablet 1   • chlorhexidine (PERIDEX) 0.12 % solution See Admin Instructions.       No facility-administered medications prior to visit.       Patient Active Problem List   Diagnosis   • Avitaminosis D   • Postherpetic neuralgia   • Shingles outbreak   • Hypothyroidism (acquired)   • Osteopenia   • Mechanical low back pain   • Mixed hyperlipidemia   • Narrowing  or closure of vagina   • Atrophic vaginitis   • Hyperchloremia   • Hypernatremia       Health Habits:  Dental Exam. up to date  Eye Exam. not up to date - soon to schedule  Exercise: 7 times/week.  Current exercise activities include: walking    Social:  See review in SnapShot activity and in SocHx section of Visit Navigator.    Health Risk Assessment:  The patient has completed a Health Risk Assessment. This has been reviewed with them and has been scanned into OnBase as a separate document.    Current Medical Providers:  Patient Care Team:  Cosme Cho DO as PCP - General    The [facility] providers who are involved in the care of this patient are listed above. Additional providers and suppliers are listed below:  none  Diagnoses and all orders for this visit:    1. Medicare annual wellness visit, subsequent (Primary)  -     CBC w AUTO Differential; Future  -     Comprehensive metabolic panel; Future  -     Vitamin D 25 hydroxy; Future  -     TSH; Future  -     Lipid panel; Future    2. Hypothyroidism (acquired)  -     levothyroxine (SYNTHROID, LEVOTHROID) 50 MCG tablet; Take 1 tablet by mouth Daily.  Dispense: 90 tablet; Refill: 1  -     CBC w AUTO Differential; Future  -     Comprehensive metabolic panel; Future  -     Vitamin D 25 hydroxy; Future  -     TSH; Future  -     Lipid panel; Future    3. Mixed hyperlipidemia  -     CBC w AUTO Differential; Future  -     Comprehensive metabolic panel; Future  -     Vitamin D 25 hydroxy; Future  -     TSH; Future  -     Lipid panel; Future    4. Avitaminosis D  -     CBC w AUTO Differential; Future  -     Comprehensive metabolic panel; Future  -     Vitamin D 25 hydroxy; Future  -     TSH; Future  -     Lipid panel; Future    5. Encounter for screening mammogram for malignant neoplasm of breast  -     Mammo Screening Bilateral With CAD; Future  -     CBC w AUTO Differential; Future  -     Comprehensive metabolic panel; Future  -     Vitamin D 25 hydroxy;  Future  -     TSH; Future  -     Lipid panel; Future    6. Hypernatremia  -     CBC w AUTO Differential; Future  -     Comprehensive metabolic panel; Future  -     Vitamin D 25 hydroxy; Future  -     TSH; Future  -     Lipid panel; Future    7. Hyperchloremia  -     CBC w AUTO Differential; Future  -     Comprehensive metabolic panel; Future  -     Vitamin D 25 hydroxy; Future  -     TSH; Future  -     Lipid panel; Future    8. Colon cancer screening  -     Ambulatory Referral For Screening Colonoscopy          Recent Hospitalizations:  No recent hospitalization(s)..    Age-appropriate Screening Schedule:  Refer to the list below for future screening recommendations based on patient's age. Orders for these recommended tests are listed in the plan section. The patient has been provided with a written plan.    Health Maintenance   Topic Date Due   • HEPATITIS C SCREENING  Never done   • ZOSTER VACCINE (2 of 3) 10/07/2016   • DXA SCAN  05/03/2021   • INFLUENZA VACCINE  10/01/2021   • LIPID PANEL  06/14/2022   • ANNUAL WELLNESS VISIT  06/21/2022   • MAMMOGRAM  06/25/2023   • COLORECTAL CANCER SCREENING  05/04/2025   • TDAP/TD VACCINES (2 - Td or Tdap) 09/10/2027   • COVID-19 Vaccine  Completed   • Pneumococcal Vaccine 65+  Completed       Depression Screen:   PHQ-2/PHQ-9 Depression Screening 6/21/2021   Little interest or pleasure in doing things 0   Feeling down, depressed, or hopeless 0   Trouble falling or staying asleep, or sleeping too much -   Feeling tired or having little energy -   Poor appetite or overeating -   Feeling bad about yourself - or that you are a failure or have let yourself or your family down -   Trouble concentrating on things, such as reading the newspaper or watching television -   Moving or speaking so slowly that other people could have noticed. Or the opposite - being so fidgety or restless that you have been moving around a lot more than usual -   Thoughts that you would be better off  dead, or of hurting yourself in some way -   Total Score 0       Functional and Cognitive Screening:  Functional & Cognitive Status 6/21/2021   Do you have difficulty preparing food and eating? No   Do you have difficulty bathing yourself, getting dressed or grooming yourself? No   Do you have difficulty using the toilet? No   Do you have difficulty moving around from place to place? No   Do you have trouble with steps or getting out of a bed or a chair? No   Current Diet Well Balanced Diet   Dental Exam Up to date   Eye Exam Not up to date   Exercise (times per week) 7 times per week   Current Exercises Include Walking   Current Exercise Activities Include -   Do you need help using the phone?  No   Are you deaf or do you have serious difficulty hearing?  No   Do you need help with transportation? No   Do you need help shopping? No   Do you need help preparing meals?  No   Do you need help with housework?  No   Do you need help with laundry? No   Do you need help taking your medications? No   Do you need help managing money? No   Do you ever drive or ride in a car without wearing a seat belt? No   Have you felt unusual stress, anger or loneliness in the last month? No   Who do you live with? Alone   If you need help, do you have trouble finding someone available to you? No   Have you been bothered in the last four weeks by sexual problems? No   Do you have difficulty concentrating, remembering or making decisions? No       Does the patient have evidence of cognitive impairment? No    Identification of Risk Factors:  Risk factors include: Advance Directive Discussion  Breast Cancer/Mammogram Screening  Cardiovascular risk  Chronic Pain   Colon Cancer Screening  Dementia/Memory   Depression/Dysphoria  Diabetic Lab Screening   Fall Risk  Hearing Problem  Immunizations Discussed/Encouraged (specific immunizations; Influenza and Shingrix )  Inactivity/Sedentary  Osteoporosis Risk.    Review of Systems   Cardiovascular:  "       Hyperlipidemia   Endocrine:        Hypothyroidism   All other systems reviewed and are negative.      Pertinent items are noted in HPI.    Objective     Vitals:    06/21/21 1040   BP: 132/82   Pulse: 86   Temp: 97.6 °F (36.4 °C)   SpO2: 98%   Weight: 73.9 kg (163 lb)   Height: 154.9 cm (61\")   Physical Exam  Vitals and nursing note reviewed.   Constitutional:       Appearance: Normal appearance. She is well-developed and well-groomed. She is obese.      Comments: Borderline exogenous obesity with a BMI of 30.8   HENT:      Head: Normocephalic and atraumatic.   Neck:      Thyroid: No thyroid mass or thyromegaly.      Vascular: Normal carotid pulses. No carotid bruit.      Trachea: Trachea and phonation normal.   Cardiovascular:      Rate and Rhythm: Normal rate and regular rhythm.      Heart sounds: Normal heart sounds. No murmur heard.   No friction rub. No gallop.    Pulmonary:      Effort: Pulmonary effort is normal. No respiratory distress.      Breath sounds: Normal breath sounds. No decreased breath sounds, wheezing, rhonchi or rales.   Musculoskeletal:      Cervical back: Neck supple.   Lymphadenopathy:      Cervical: No cervical adenopathy.   Skin:     General: Skin is warm and dry.      Findings: No rash.   Neurological:      Mental Status: She is alert and oriented to person, place, and time.   Psychiatric:         Attention and Perception: Attention and perception normal.         Mood and Affect: Mood and affect normal.         Speech: Speech normal.         Behavior: Behavior normal. Behavior is cooperative.         Thought Content: Thought content normal.         Cognition and Memory: Cognition and memory normal.         Judgment: Judgment normal.       No visits with results within 4 Week(s) from this visit.   Latest known visit with results is:   Office Visit on 02/02/2021   Component Date Value Ref Range Status   • Diagnosis 02/02/2021 Comment   Final    Comment: NEGATIVE FOR INTRAEPITHELIAL " LESION OR MALIGNANCY.  CELLULAR CHANGES ASSOCIATED WITH ATROPHY ARE PRESENT.     • Specimen adequacy: 02/02/2021 Comment   Final    Comment: Satisfactory for evaluation.  Endocervical component may not be  distinguished in cases of atrophy.     • Clinician Provided ICD-10: 02/02/2021 Comment   Final    Z01.419   • Performed by: 02/02/2021 Comment   Final    Darinel Chavarria, Cytotechnologist (ASCP)   • . 02/02/2021 .   Final   • Note: 02/02/2021 Comment   Final    Comment: The Pap smear is a screening test designed to aid in the detection of  premalignant and malignant conditions of the uterine cervix.  It is not a  diagnostic procedure and should not be used as the sole means of detecting  cervical cancer.  Both false-positive and false-negative reports do occur.     • Method: 02/02/2021 Comment   Final    Comment: This liquid based ThinPrep(R) pap test was screened with the  use of an image guided system.     • HPV Aptima 02/02/2021 Negative  Negative Final    Comment: This nucleic acid amplification test detects fourteen high-risk  HPV types (16,18,31,33,35,39,45,51,52,56,58,59,66,68) without  differentiation.       See labs dated 6/14/2021    Body mass index is 30.8 kg/m².    Assessment/Plan   Patient Self-Management and Personalized Health Advice  The patient has been provided with information about: diet, exercise, weight management and prevention of cardiac or vascular disease and preventive services including:   · Annual Wellness Visit (AWV)  · Bone Density Measurements  · Cardiovascular Disease Screening Tests (may do this order every 5 years in beneficiaries without signs or symptoms of cardiovascular disease)  · Colorectal Cancer Screening, Colonoscopy  · Diabetes Screening-Lab Order for either glucose quantitative blood (except reagent strip), glucose;post glucose dose(includes glucose), or glucose tolerance test-3 specimens(includes glucose)  · Influenza Vaccine and Administration  · Screening  Mammography .    Discussed the patient's BMI with her. The BMI is in the acceptable range.    Orders:  Orders Placed This Encounter   Procedures   • Mammo Screening Bilateral With CAD   • Comprehensive metabolic panel   • Vitamin D 25 hydroxy   • TSH   • Lipid panel   • Ambulatory Referral For Screening Colonoscopy   • CBC w AUTO Differential       Follow Up:  Return in about 6 months (around 12/21/2021).   Have discussed more diligent care to lowering cholesterol in her diet.  Have also asked her to refrain from salt usage.  We will continue to monitor both lipid status and electrolytes.  Current thyroid dosage will remain the same.  Mammogram will be ordered today with consideration of repeat breast exam in 6 months.  Fasting labs will be acquired at that time also.  DEXA scan not due as indicated above until 2024.  Chart will be reviewed for need for future colonoscopy.  An After Visit Summary and PPPS with all of these plans were given to the patient.

## 2021-06-24 ENCOUNTER — TRANSCRIBE ORDERS (OUTPATIENT)
Dept: ADMINISTRATIVE | Facility: HOSPITAL | Age: 71
End: 2021-06-24

## 2021-06-24 DIAGNOSIS — Z12.31 ENCOUNTER FOR SCREENING MAMMOGRAM FOR MALIGNANT NEOPLASM OF BREAST: Primary | ICD-10-CM

## 2021-06-25 ENCOUNTER — HOSPITAL ENCOUNTER (OUTPATIENT)
Dept: MAMMOGRAPHY | Facility: HOSPITAL | Age: 71
Discharge: HOME | End: 2021-06-25
Admitting: FAMILY MEDICINE

## 2021-06-25 DIAGNOSIS — Z12.31 ENCOUNTER FOR SCREENING MAMMOGRAM FOR MALIGNANT NEOPLASM OF BREAST: ICD-10-CM

## 2021-06-25 PROCEDURE — 77067 SCR MAMMO BI INCL CAD: CPT

## 2021-06-25 PROCEDURE — 77063 BREAST TOMOSYNTHESIS BI: CPT

## 2021-10-08 ENCOUNTER — PREP FOR SURGERY (OUTPATIENT)
Dept: OTHER | Facility: HOSPITAL | Age: 71
End: 2021-10-08

## 2021-10-08 DIAGNOSIS — Z12.11 SCREEN FOR COLON CANCER: ICD-10-CM

## 2021-10-08 DIAGNOSIS — Z86.010 HISTORY OF ADENOMATOUS POLYP OF COLON: Primary | ICD-10-CM

## 2021-12-01 PROBLEM — Z12.11 SCREEN FOR COLON CANCER: Status: ACTIVE | Noted: 2021-12-01

## 2021-12-01 PROBLEM — Z86.010 HISTORY OF ADENOMATOUS POLYP OF COLON: Status: ACTIVE | Noted: 2021-12-01

## 2021-12-09 DIAGNOSIS — E87.8 HYPERCHLOREMIA: ICD-10-CM

## 2021-12-09 DIAGNOSIS — Z00.00 MEDICARE ANNUAL WELLNESS VISIT, SUBSEQUENT: ICD-10-CM

## 2021-12-09 DIAGNOSIS — Z12.31 ENCOUNTER FOR SCREENING MAMMOGRAM FOR MALIGNANT NEOPLASM OF BREAST: ICD-10-CM

## 2021-12-09 DIAGNOSIS — E03.9 HYPOTHYROIDISM (ACQUIRED): ICD-10-CM

## 2021-12-09 DIAGNOSIS — E78.2 MIXED HYPERLIPIDEMIA: ICD-10-CM

## 2021-12-09 DIAGNOSIS — E55.9 AVITAMINOSIS D: ICD-10-CM

## 2021-12-09 DIAGNOSIS — E87.0 HYPERNATREMIA: ICD-10-CM

## 2021-12-14 LAB
25(OH)D3+25(OH)D2 SERPL-MCNC: 91.3 NG/ML (ref 30–100)
ALBUMIN SERPL-MCNC: 4.4 G/DL (ref 3.7–4.7)
ALBUMIN/GLOB SERPL: 1.8 {RATIO} (ref 1.2–2.2)
ALP SERPL-CCNC: 91 IU/L (ref 44–121)
ALT SERPL-CCNC: 23 IU/L (ref 0–32)
AST SERPL-CCNC: 19 IU/L (ref 0–40)
BASOPHILS # BLD AUTO: 0 X10E3/UL (ref 0–0.2)
BASOPHILS NFR BLD AUTO: 1 %
BILIRUB SERPL-MCNC: 0.5 MG/DL (ref 0–1.2)
BUN SERPL-MCNC: 13 MG/DL (ref 8–27)
BUN/CREAT SERPL: 15 (ref 12–28)
CALCIUM SERPL-MCNC: 9.5 MG/DL (ref 8.7–10.3)
CHLORIDE SERPL-SCNC: 103 MMOL/L (ref 96–106)
CHOLEST SERPL-MCNC: 270 MG/DL (ref 100–199)
CO2 SERPL-SCNC: 25 MMOL/L (ref 20–29)
CREAT SERPL-MCNC: 0.89 MG/DL (ref 0.57–1)
EOSINOPHIL # BLD AUTO: 0.2 X10E3/UL (ref 0–0.4)
EOSINOPHIL NFR BLD AUTO: 5 %
ERYTHROCYTE [DISTWIDTH] IN BLOOD BY AUTOMATED COUNT: 13.2 % (ref 11.7–15.4)
GLOBULIN SER CALC-MCNC: 2.5 G/DL (ref 1.5–4.5)
GLUCOSE SERPL-MCNC: 99 MG/DL (ref 65–99)
HCT VFR BLD AUTO: 42.7 % (ref 34–46.6)
HDLC SERPL-MCNC: 57 MG/DL
HGB BLD-MCNC: 14.6 G/DL (ref 11.1–15.9)
IMM GRANULOCYTES # BLD AUTO: 0 X10E3/UL (ref 0–0.1)
IMM GRANULOCYTES NFR BLD AUTO: 0 %
LDLC SERPL CALC-MCNC: 177 MG/DL (ref 0–99)
LYMPHOCYTES # BLD AUTO: 1.1 X10E3/UL (ref 0.7–3.1)
LYMPHOCYTES NFR BLD AUTO: 23 %
MCH RBC QN AUTO: 30.5 PG (ref 26.6–33)
MCHC RBC AUTO-ENTMCNC: 34.2 G/DL (ref 31.5–35.7)
MCV RBC AUTO: 89 FL (ref 79–97)
MONOCYTES # BLD AUTO: 0.2 X10E3/UL (ref 0.1–0.9)
MONOCYTES NFR BLD AUTO: 4 %
NEUTROPHILS # BLD AUTO: 3.2 X10E3/UL (ref 1.4–7)
NEUTROPHILS NFR BLD AUTO: 67 %
PLATELET # BLD AUTO: 279 X10E3/UL (ref 150–450)
POTASSIUM SERPL-SCNC: 4.4 MMOL/L (ref 3.5–5.2)
PROT SERPL-MCNC: 6.9 G/DL (ref 6–8.5)
RBC # BLD AUTO: 4.78 X10E6/UL (ref 3.77–5.28)
SODIUM SERPL-SCNC: 142 MMOL/L (ref 134–144)
TRIGL SERPL-MCNC: 196 MG/DL (ref 0–149)
TSH SERPL DL<=0.005 MIU/L-ACNC: 7.19 UIU/ML (ref 0.45–4.5)
VLDLC SERPL CALC-MCNC: 36 MG/DL (ref 5–40)
WBC # BLD AUTO: 4.8 X10E3/UL (ref 3.4–10.8)

## 2021-12-20 ENCOUNTER — OFFICE VISIT (OUTPATIENT)
Dept: FAMILY MEDICINE CLINIC | Facility: CLINIC | Age: 71
End: 2021-12-20

## 2021-12-20 ENCOUNTER — DOCUMENTATION (OUTPATIENT)
Dept: FAMILY MEDICINE CLINIC | Facility: CLINIC | Age: 71
End: 2021-12-20

## 2021-12-20 VITALS
OXYGEN SATURATION: 98 % | TEMPERATURE: 97.3 F | HEART RATE: 96 BPM | DIASTOLIC BLOOD PRESSURE: 80 MMHG | SYSTOLIC BLOOD PRESSURE: 122 MMHG | WEIGHT: 166 LBS | BODY MASS INDEX: 31.34 KG/M2 | HEIGHT: 61 IN

## 2021-12-20 DIAGNOSIS — E03.9 HYPOTHYROIDISM (ACQUIRED): Primary | ICD-10-CM

## 2021-12-20 DIAGNOSIS — E55.9 AVITAMINOSIS D: ICD-10-CM

## 2021-12-20 DIAGNOSIS — M54.59 MECHANICAL LOW BACK PAIN: ICD-10-CM

## 2021-12-20 DIAGNOSIS — E78.2 MIXED HYPERLIPIDEMIA: ICD-10-CM

## 2021-12-20 DIAGNOSIS — M26.609 TMJ (TEMPOROMANDIBULAR JOINT DISORDER): ICD-10-CM

## 2021-12-20 PROCEDURE — 99213 OFFICE O/P EST LOW 20 MIN: CPT | Performed by: FAMILY MEDICINE

## 2021-12-20 RX ORDER — CYCLOBENZAPRINE HCL 10 MG
10 TABLET ORAL 3 TIMES DAILY PRN
Qty: 270 TABLET | Refills: 0 | Status: SHIPPED | OUTPATIENT
Start: 2021-12-20 | End: 2022-09-14 | Stop reason: SDUPTHER

## 2021-12-20 NOTE — PROGRESS NOTES
Subjective   Fatoumata Perry is a 71 y.o. female with   Chief Complaint   Patient presents with   • Hypothyroidism   • Med Refill     needing refill on Flexieril   .    History of Present Illness   71-year-old  female with hypothyroidism here for further medical management.  Patient also with long history of degenerative disc disease of the lumbar spine using Flexeril on a as needed basis.  She will need refill of same.  She has been using homeopathic thyroid drops rather than the previously prescribed levothyroxine.  Fasting labs have been obtained prior to this visit.  In general she is doing well and has no acute complaints.  The following portions of the patient's history were reviewed and updated as appropriate: allergies, current medications, past family history, past medical history, past social history, past surgical history and problem list.    Review of Systems   Endocrine:        Hypothyroidism   Musculoskeletal: Positive for back pain.       Objective     Vitals:    12/20/21 0943   BP: 122/80   Pulse: 96   Temp: 97.3 °F (36.3 °C)   SpO2: 98%       Recent Results (from the past 672 hour(s))   Lipid panel    Collection Time: 12/13/21  9:29 AM    Specimen: Blood   Result Value Ref Range    Total Cholesterol 270 (H) 100 - 199 mg/dL    Triglycerides 196 (H) 0 - 149 mg/dL    HDL Cholesterol 57 >39 mg/dL    VLDL Cholesterol Deon 36 5 - 40 mg/dL    LDL Chol Calc (NIH) 177 (H) 0 - 99 mg/dL   TSH    Collection Time: 12/13/21  9:29 AM    Specimen: Blood   Result Value Ref Range    TSH 7.190 (H) 0.450 - 4.500 uIU/mL   Vitamin D 25 hydroxy    Collection Time: 12/13/21  9:29 AM    Specimen: Blood   Result Value Ref Range    25 Hydroxy, Vitamin D 91.3 30.0 - 100.0 ng/mL   Comprehensive metabolic panel    Collection Time: 12/13/21  9:29 AM    Specimen: Blood   Result Value Ref Range    Glucose 99 65 - 99 mg/dL    BUN 13 8 - 27 mg/dL    Creatinine 0.89 0.57 - 1.00 mg/dL    eGFR Non African Am 65 >59 mL/min/1.73     eGFR African Am 75 >59 mL/min/1.73    BUN/Creatinine Ratio 15 12 - 28    Sodium 142 134 - 144 mmol/L    Potassium 4.4 3.5 - 5.2 mmol/L    Chloride 103 96 - 106 mmol/L    Total CO2 25 20 - 29 mmol/L    Calcium 9.5 8.7 - 10.3 mg/dL    Total Protein 6.9 6.0 - 8.5 g/dL    Albumin 4.4 3.7 - 4.7 g/dL    Globulin 2.5 1.5 - 4.5 g/dL    A/G Ratio 1.8 1.2 - 2.2    Total Bilirubin 0.5 0.0 - 1.2 mg/dL    Alkaline Phosphatase 91 44 - 121 IU/L    AST (SGOT) 19 0 - 40 IU/L    ALT (SGPT) 23 0 - 32 IU/L   CBC w AUTO Differential    Collection Time: 12/13/21  9:29 AM    Specimen: Blood   Result Value Ref Range    WBC 4.8 3.4 - 10.8 x10E3/uL    RBC 4.78 3.77 - 5.28 x10E6/uL    Hemoglobin 14.6 11.1 - 15.9 g/dL    Hematocrit 42.7 34.0 - 46.6 %    MCV 89 79 - 97 fL    MCH 30.5 26.6 - 33.0 pg    MCHC 34.2 31.5 - 35.7 g/dL    RDW 13.2 11.7 - 15.4 %    Platelets 279 150 - 450 x10E3/uL    Neutrophil Rel % 67 Not Estab. %    Lymphocyte Rel % 23 Not Estab. %    Monocyte Rel % 4 Not Estab. %    Eosinophil Rel % 5 Not Estab. %    Basophil Rel % 1 Not Estab. %    Neutrophils Absolute 3.2 1.4 - 7.0 x10E3/uL    Lymphocytes Absolute 1.1 0.7 - 3.1 x10E3/uL    Monocytes Absolute 0.2 0.1 - 0.9 x10E3/uL    Eosinophils Absolute 0.2 0.0 - 0.4 x10E3/uL    Basophils Absolute 0.0 0.0 - 0.2 x10E3/uL    Immature Granulocyte Rel % 0 Not Estab. %    Immature Grans Absolute 0.0 0.0 - 0.1 x10E3/uL       Physical Exam  Vitals and nursing note reviewed.   Constitutional:       Appearance: Normal appearance. She is well-developed and well-groomed.   HENT:      Head: Normocephalic and atraumatic.   Neck:      Thyroid: No thyroid mass or thyromegaly.      Vascular: Normal carotid pulses. No carotid bruit.      Trachea: Trachea and phonation normal.   Cardiovascular:      Rate and Rhythm: Normal rate and regular rhythm.      Heart sounds: Normal heart sounds. No murmur heard.  No friction rub. No gallop.    Pulmonary:      Effort: Pulmonary effort is normal. No  respiratory distress.      Breath sounds: Normal breath sounds. No decreased breath sounds, wheezing, rhonchi or rales.   Musculoskeletal:      Cervical back: Neck supple.   Lymphadenopathy:      Cervical: No cervical adenopathy.   Skin:     General: Skin is warm and dry.      Findings: No rash.   Neurological:      Mental Status: She is alert and oriented to person, place, and time.   Psychiatric:         Attention and Perception: Attention and perception normal.         Mood and Affect: Mood and affect normal.         Speech: Speech normal.         Behavior: Behavior normal. Behavior is cooperative.         Thought Content: Thought content normal.         Cognition and Memory: Cognition and memory normal.         Judgment: Judgment normal.         Assessment/Plan   Diagnoses and all orders for this visit:    1. Hypothyroidism (acquired) (Primary)  -     Lipid panel; Future  -     Comprehensive metabolic panel; Future  -     Vitamin D 25 hydroxy; Future  -     TSH; Future  -     CBC w AUTO Differential; Future    2. Mixed hyperlipidemia  -     Lipid panel; Future  -     Comprehensive metabolic panel; Future  -     Vitamin D 25 hydroxy; Future  -     TSH; Future  -     CBC w AUTO Differential; Future    3. Avitaminosis D  -     Lipid panel; Future  -     Comprehensive metabolic panel; Future  -     Vitamin D 25 hydroxy; Future  -     TSH; Future  -     CBC w AUTO Differential; Future    4. Mechanical low back pain  -     Lipid panel; Future  -     Comprehensive metabolic panel; Future  -     Vitamin D 25 hydroxy; Future  -     TSH; Future  -     CBC w AUTO Differential; Future    5. TMJ (temporomandibular joint disorder)  -     cyclobenzaprine (FLEXERIL) 10 MG tablet; Take 1 tablet by mouth 3 (Three) Times a Day As Needed for Muscle Spasms.  Dispense: 270 tablet; Refill: 0  -     Lipid panel; Future  -     Comprehensive metabolic panel; Future  -     Vitamin D 25 hydroxy; Future  -     TSH; Future  -     CBC w AUTO  Differential; Future    Patient must lower cholesterol in her diet with an LDL goal of 130 or less.  Have strongly recommended returning to levothyroxine which patient refuses and will just increase the number of thyroid drops.  Repeat labs will take place in 6 months.    Return in about 6 months (around 6/20/2022) for Recheck.

## 2022-03-24 ENCOUNTER — TELEPHONE (OUTPATIENT)
Dept: FAMILY MEDICINE CLINIC | Facility: CLINIC | Age: 72
End: 2022-03-24

## 2022-03-24 RX ORDER — AMPICILLIN TRIHYDRATE 250 MG
1 CAPSULE ORAL DAILY
COMMUNITY

## 2022-03-24 RX ORDER — MULTIVIT WITH MINERALS/LUTEIN
250 TABLET ORAL DAILY
COMMUNITY

## 2022-03-24 RX ORDER — LANOLIN ALCOHOL/MO/W.PET/CERES
1 CREAM (GRAM) TOPICAL DAILY
COMMUNITY

## 2022-03-24 RX ORDER — OMEGA-3 FATTY ACIDS/FISH OIL 300-1000MG
1000 CAPSULE ORAL DAILY
COMMUNITY

## 2022-03-24 RX ORDER — MAGNESIUM 200 MG
1 TABLET ORAL DAILY
COMMUNITY

## 2022-03-24 NOTE — TELEPHONE ENCOUNTER
PATIENT IS COUGHING MOSTLY AT NIGHT, WATERY EYES FOR 3 DAYS. SHE TAKES ZERTEC EVERY DAY. IS WANTING TO KNOW WHAT SHOULD TAKE ESPECIALLY AT NIGHT FOR THE COUGHING. WOULD LIKE AN ANTIBIOTIC.    PLEASE ADVISE  [phone]     [id number] - [place], [place] - [address] - [phone]  - [phone]   [phone]

## 2022-03-24 NOTE — TELEPHONE ENCOUNTER
Eating Heart-Healthy Food: Using the DASH Plan    Eating for your heart doesnt have to be hard or boring. You just need to know how to make healthier choices. The DASH eating plan has been developed to help you do just that. DASH stands for Dietary Approaches to Stop Hypertension. It is a plan that has been proven to be healthier for your heart and to lower your risk for high blood pressure. It can also help lower your risk for cancer, heart disease, osteoporosis, and diabetes.  Choosing from each food group  Choose foods from each of the food groups below each day. Try to get the recommended number of servings for each food group. The serving numbers are based on a diet of 2,000 calories a day. Talk to your doctor if youre unsure about your calorie needs. Along with getting the correct servings, the DASH plan also recommends a sodium intake less than 2,300 mg per day.        Grains  Servings: 6 to 8 a day  A serving is:  · 1 slice bread  · 1 ounce dry cereal  · Half a cup cooked rice, pasta or cereal  Best choices: Whole grains and any grains high in fiber. Vegetables  Servings: 4 to 5 a day  A serving is:  · 1 cup raw leafy vegetable  · Half a cup cut-up raw or cooked vegetable  · Half a cup vegetable juice  Best choices: Fresh or frozen vegetables prepared without added salt or fat.   Fruits  Servings: 4 to 5 a day  A serving is:  · 1 medium fruit  · One-quarter cup dried fruit  · Half a cup fresh, frozen, or canned fruit  · Half a cup of 100% fruit juices  Best choices: A variety of fresh fruits of different colors. Whole fruits are a better choice than fruit juices. Low-fat or fat-free dairy  Servings: 2 to 3 a day  A serving is:  · 1 cup milk  · 1 cup yogurt  · One and a half ounces cheese  Best choices: Skim or 1% milk, low-fat or fat-free yogurt or buttermilk, and low-fat cheeses.         Lean meats, poultry, fish  Servings: 6 or fewer a day  A serving is:  · 1 ounce cooked meats, poultry, or fish  · 1  .   egg  Best choices: Lean poultry and fish. Trim away visible fat. Broil, grill, roast, or boil instead of frying. Remove skin from poultry before eating. Limit how much red meat you eat.  Nuts, seeds, beans  Servings: 4 to 5 a week  A serving is:  · One-third cup nuts (one and a half ounces)  · 2 tablespoons nut butter or seeds  · Half a cup cooked dry beans or legumes  Best choices: Dry roasted nuts with no salt added, lentils, kidney beans, garbanzo beans, and whole watkins beans.   Fats and oils  Servings: 2 to 3 a day  A serving is:  · 1 teaspoon vegetable oil  · 1 teaspoon soft margarine  · 1 tablespoon mayonnaise  · 2 tablespoons salad dressing  Best choices: Nut and vegetable oils (nontropical vegetable oils), such as olive and canola oil. Sweets  Servings: 5 a week or fewer  A serving is:  · 1 tablespoon sugar, maple syrup, or honey  · 1 tablespoon jam or jelly  · 1 half-ounce jelly beans (about 15)  · 1 cup lemonade  Best choices: Dried fruit can be a satisfying sweet. Choose low-fat sweets. And watch your serving sizes!      For more on the DASH eating plan, visit:  www.nhlbi.nih.gov/health/health-topics/topics/dash   Date Last Reviewed: 6/1/2016  © 4076-3835 [employer]. [address] [place] [address], [place] [address]. All rights reserved. This information is not intended as a substitute for professional medical care. Always follow your healthcare professional's instructions.

## 2022-03-25 RX ORDER — DEXTROMETHORPHAN HYDROBROMIDE AND PROMETHAZINE HYDROCHLORIDE 15; 6.25 MG/5ML; MG/5ML
5 SYRUP ORAL 4 TIMES DAILY PRN
Qty: 240 ML | Refills: 0 | Status: SHIPPED | OUTPATIENT
Start: 2022-03-25 | End: 2023-01-11

## 2022-03-25 NOTE — TELEPHONE ENCOUNTER
After 3 days there is no indication for the need of an antibiotic so 1 will not be sent.  I have sent a cough medicine for her at her pharmacy.  Please have her optimize her allergy treatment which could include nonsedating antihistamines, Flonase nasal sprays or like, can also use other antihistamines at night such as Chlor-Trimeton.

## 2022-03-28 ENCOUNTER — ANESTHESIA EVENT (OUTPATIENT)
Dept: GASTROENTEROLOGY | Facility: HOSPITAL | Age: 72
End: 2022-03-28

## 2022-03-28 ENCOUNTER — HOSPITAL ENCOUNTER (OUTPATIENT)
Facility: HOSPITAL | Age: 72
Setting detail: HOSPITAL OUTPATIENT SURGERY
Discharge: HOME | End: 2022-03-28
Attending: SURGERY | Admitting: SURGERY

## 2022-03-28 ENCOUNTER — ANESTHESIA (OUTPATIENT)
Dept: GASTROENTEROLOGY | Facility: HOSPITAL | Age: 72
End: 2022-03-28

## 2022-03-28 VITALS
SYSTOLIC BLOOD PRESSURE: 119 MMHG | DIASTOLIC BLOOD PRESSURE: 60 MMHG | RESPIRATION RATE: 16 BRPM | HEIGHT: 63 IN | OXYGEN SATURATION: 98 % | BODY MASS INDEX: 28.17 KG/M2 | HEART RATE: 69 BPM | WEIGHT: 159 LBS | TEMPERATURE: 97.8 F

## 2022-03-28 PROCEDURE — 25010000002 GLUCAGON (RDNA) PER 1 MG: Performed by: SURGERY

## 2022-03-28 PROCEDURE — 25010000002 PROPOFOL 10 MG/ML EMULSION: Performed by: ANESTHESIOLOGY

## 2022-03-28 PROCEDURE — G0105 COLORECTAL SCRN; HI RISK IND: HCPCS | Performed by: SURGERY

## 2022-03-28 RX ORDER — LIDOCAINE HYDROCHLORIDE 20 MG/ML
INJECTION, SOLUTION INFILTRATION; PERINEURAL AS NEEDED
Status: DISCONTINUED | OUTPATIENT
Start: 2022-03-28 | End: 2022-03-28 | Stop reason: SURG

## 2022-03-28 RX ORDER — PROPOFOL 10 MG/ML
VIAL (ML) INTRAVENOUS CONTINUOUS PRN
Status: DISCONTINUED | OUTPATIENT
Start: 2022-03-28 | End: 2022-03-28 | Stop reason: SURG

## 2022-03-28 RX ORDER — SODIUM CHLORIDE, SODIUM LACTATE, POTASSIUM CHLORIDE, CALCIUM CHLORIDE 600; 310; 30; 20 MG/100ML; MG/100ML; MG/100ML; MG/100ML
30 INJECTION, SOLUTION INTRAVENOUS CONTINUOUS PRN
Status: DISCONTINUED | OUTPATIENT
Start: 2022-03-28 | End: 2022-03-28 | Stop reason: HOSPADM

## 2022-03-28 RX ADMIN — LIDOCAINE HYDROCHLORIDE 60 MG: 20 INJECTION, SOLUTION INFILTRATION; PERINEURAL at 10:09

## 2022-03-28 RX ADMIN — SODIUM CHLORIDE, POTASSIUM CHLORIDE, SODIUM LACTATE AND CALCIUM CHLORIDE 30 ML/HR: 600; 310; 30; 20 INJECTION, SOLUTION INTRAVENOUS at 09:23

## 2022-03-28 RX ADMIN — PROPOFOL 200 MCG/KG/MIN: 10 INJECTION, EMULSION INTRAVENOUS at 10:09

## 2022-03-28 NOTE — ANESTHESIA PREPROCEDURE EVALUATION
Anesthesia Evaluation     Patient summary reviewed and Nursing notes reviewed   history of anesthetic complications: PONV               Airway   Mallampati: I  TM distance: >3 FB  Neck ROM: full  No difficulty expected  Dental - normal exam     Pulmonary - negative pulmonary ROS and normal exam   Cardiovascular - normal exam    (+) hyperlipidemia,       Neuro/Psych  (+) psychiatric history Anxiety,    GI/Hepatic/Renal/Endo    (+)   thyroid problem hypothyroidism    Musculoskeletal (-) negative ROS    Abdominal  - normal exam    Bowel sounds: normal.   Substance History - negative use     OB/GYN negative ob/gyn ROS         Other                        Anesthesia Plan    ASA 2     MAC       Anesthetic plan, all risks, benefits, and alternatives have been provided, discussed and informed consent has been obtained with: patient.        CODE STATUS:

## 2022-03-28 NOTE — OP NOTE
Colonoscopy Procedure Note  Fatoumata Perry  1950  Date of Procedure: 03/28/22    Pre-operative Diagnosis:    · Screening  · prior history of adenomatous polyp 2016.     Post-operative Diagnosis:  · normal    Procedure: Colonoscopy and terminal ileoscopy       Recommendations:   · Colonoscopy in 5 years.   · Keep a copy of the photographs of the procedure given to you today for possible need for reference in the future.      Surgeon: Vale    Anesthetic: MAC per Hamzah Mcdonnell MD    Scope Withdrawal Time:  6 minutes  30 seconds    Procedure Details     MAC anesthesia was induced.  The 180 Colonoscopy was inserted blindly into the rectum and advanced to the cecum, with relative ease,  without need for pressure, lift, or turning.    Cecum was identified by the appendiceal orifice and the ileocecal valve and photographed for documentation.  Terminal ileum was intubated and was normal.      Prep quality was very good.  A careful inspection was made as the scope was withdrawn, including a retroflexed view of the rectum; there was no suggestion of presence of angiodysplasias, colitis, polyps or diverticula, with no interventions.     Retroflexion in the rectum revealed no abnormalities.      Katja Collazo MD  03/28/22

## 2022-03-28 NOTE — ANESTHESIA POSTPROCEDURE EVALUATION
"Patient: Fatoumata Perry    Procedure Summary     Date: 03/28/22 Room / Location: [facility] /  [facility]    Anesthesia Start: 1006 Anesthesia Stop: 1026    Procedure: COLONOSCOPY TO CECUM AND TI (N/A ) Diagnosis:       History of adenomatous polyp of colon      Screen for colon cancer      (History of adenomatous polyp of colon [Z86.010])      (Screen for colon cancer [Z12.11])    Surgeons: Katja Collazo MD Provider: Hamzah Mcdonnell MD    Anesthesia Type: MAC ASA Status: 2          Anesthesia Type: MAC    Vitals  Vitals Value Taken Time   /60 03/28/22 1038   Temp 36.6 °C (97.8 °F) 03/28/22 1038   Pulse 69 03/28/22 1038   Resp 16 03/28/22 1038   SpO2 98 % 03/28/22 1038           Post Anesthesia Care and Evaluation    Patient location during evaluation: PACU  Patient participation: complete - patient participated  Level of consciousness: awake  Pain score: 0  Pain management: adequate  Airway patency: patent  Anesthetic complications: No anesthetic complications  PONV Status: none  Cardiovascular status: acceptable  Respiratory status: acceptable  Hydration status: acceptable    Comments: /60 (BP Location: Left arm, Patient Position: Lying)   Pulse 69   Temp 36.6 °C (97.8 °F) (Oral)   Resp 16   Ht 160 cm (63\")   Wt 72.1 kg (159 lb)   SpO2 98%   BMI 28.17 kg/m²       "

## 2022-03-28 NOTE — H&P
Cc: Endoscopy Visit    HPI: 72 y.o. female here for screening with a prior history of adenomatous polyp 2016.  No family history of colon cancer.     Past Medical History:   Diagnosis Date   • Anxiety    • Arthritis    • Colon polyps    • Gallstones    • Hemorrhoids    • History of shingles    • Hypothyroidism    • PONV (postoperative nausea and vomiting)        Past Surgical History:   Procedure Laterality Date   • BLADDER REPAIR      bladder lift and mesh   • CARDIAC CATHETERIZATION     • COLONOSCOPY     • ENDOSCOPY     • HYSTERECTOMY  2008   • OOPHORECTOMY  2008       is allergic to codeine, meperidine, propoxyphene-acetaminophen, and latex.       Medication List      ASK your doctor about these medications    cyclobenzaprine 10 MG tablet  Commonly known as: FLEXERIL  Take 1 tablet by mouth 3 (Three) Times a Day As Needed for Muscle Spasms.     Lysine HCl 500 MG tablet     Magnesium 200 MG tablet     Omega 3 1000 MG capsule     promethazine-dextromethorphan 6.25-15 MG/5ML syrup  Commonly known as: PROMETHAZINE-DM  Take 5 mL by mouth 4 (Four) Times a Day As Needed for Cough.     Red Yeast Rice 600 MG capsule     Unable to find     vitamin C 250 MG tablet  Commonly known as: ASCORBIC ACID     vitamin D3 125 MCG (5000 UT) capsule capsule            Family History   Problem Relation Age of Onset   • Breast cancer Neg Hx    • Malig Hyperthermia Neg Hx        Social History     Socioeconomic History   • Marital status: Single   Tobacco Use   • Smoking status: Never Smoker   • Smokeless tobacco: Never Used   Vaping Use   • Vaping Use: Never used   Substance and Sexual Activity   • Alcohol use: No   • Drug use: No   • Sexual activity: Defer       Vitals:    03/28/22 0908   BP: 155/74   Pulse: 92   Resp: 14   SpO2: 99%       Body mass index is 28.17 kg/m².    Physical Exam    General: No acute distress  Lungs: No labored breathing, Pulse oximetry on room air is 99%.  Heart/EKG: RRR  Abdomen: no complaints of pain  Mental:   Awake, alert, and oriented    Imp:     · Screening  · prior history of adenomatous polyp 2016      Plan:  · C scope    Katja Collazo MD  10:06 EDT

## 2022-05-11 DIAGNOSIS — M54.59 MECHANICAL LOW BACK PAIN: ICD-10-CM

## 2022-05-11 DIAGNOSIS — E78.2 MIXED HYPERLIPIDEMIA: ICD-10-CM

## 2022-05-11 DIAGNOSIS — M26.609 TMJ (TEMPOROMANDIBULAR JOINT DISORDER): ICD-10-CM

## 2022-05-11 DIAGNOSIS — E03.9 HYPOTHYROIDISM (ACQUIRED): ICD-10-CM

## 2022-05-11 DIAGNOSIS — E55.9 AVITAMINOSIS D: ICD-10-CM

## 2022-05-17 LAB
25(OH)D3+25(OH)D2 SERPL-MCNC: 92.1 NG/ML (ref 30–100)
ALBUMIN SERPL-MCNC: 4.2 G/DL (ref 3.7–4.7)
ALBUMIN/GLOB SERPL: 1.8 {RATIO} (ref 1.2–2.2)
ALP SERPL-CCNC: 78 IU/L (ref 44–121)
ALT SERPL-CCNC: 19 IU/L (ref 0–32)
AST SERPL-CCNC: 20 IU/L (ref 0–40)
BASOPHILS # BLD AUTO: 0 X10E3/UL (ref 0–0.2)
BASOPHILS NFR BLD AUTO: 1 %
BILIRUB SERPL-MCNC: 0.6 MG/DL (ref 0–1.2)
BUN SERPL-MCNC: 12 MG/DL (ref 8–27)
BUN/CREAT SERPL: 14 (ref 12–28)
CALCIUM SERPL-MCNC: 9.3 MG/DL (ref 8.7–10.3)
CHLORIDE SERPL-SCNC: 106 MMOL/L (ref 96–106)
CHOLEST SERPL-MCNC: 228 MG/DL (ref 100–199)
CO2 SERPL-SCNC: 22 MMOL/L (ref 20–29)
CREAT SERPL-MCNC: 0.83 MG/DL (ref 0.57–1)
EGFRCR SERPLBLD CKD-EPI 2021: 75 ML/MIN/1.73
EOSINOPHIL # BLD AUTO: 0.1 X10E3/UL (ref 0–0.4)
EOSINOPHIL NFR BLD AUTO: 3 %
ERYTHROCYTE [DISTWIDTH] IN BLOOD BY AUTOMATED COUNT: 13.5 % (ref 11.7–15.4)
GLOBULIN SER CALC-MCNC: 2.3 G/DL (ref 1.5–4.5)
GLUCOSE SERPL-MCNC: 101 MG/DL (ref 65–99)
HCT VFR BLD AUTO: 40.6 % (ref 34–46.6)
HDLC SERPL-MCNC: 57 MG/DL
HGB BLD-MCNC: 13.5 G/DL (ref 11.1–15.9)
IMM GRANULOCYTES # BLD AUTO: 0 X10E3/UL (ref 0–0.1)
IMM GRANULOCYTES NFR BLD AUTO: 0 %
LDLC SERPL CALC-MCNC: 149 MG/DL (ref 0–99)
LYMPHOCYTES # BLD AUTO: 1.3 X10E3/UL (ref 0.7–3.1)
LYMPHOCYTES NFR BLD AUTO: 31 %
MCH RBC QN AUTO: 29.6 PG (ref 26.6–33)
MCHC RBC AUTO-ENTMCNC: 33.3 G/DL (ref 31.5–35.7)
MCV RBC AUTO: 89 FL (ref 79–97)
MONOCYTES # BLD AUTO: 0.2 X10E3/UL (ref 0.1–0.9)
MONOCYTES NFR BLD AUTO: 6 %
NEUTROPHILS # BLD AUTO: 2.4 X10E3/UL (ref 1.4–7)
NEUTROPHILS NFR BLD AUTO: 59 %
PLATELET # BLD AUTO: 244 X10E3/UL (ref 150–450)
POTASSIUM SERPL-SCNC: 4.3 MMOL/L (ref 3.5–5.2)
PROT SERPL-MCNC: 6.5 G/DL (ref 6–8.5)
RBC # BLD AUTO: 4.56 X10E6/UL (ref 3.77–5.28)
SODIUM SERPL-SCNC: 142 MMOL/L (ref 134–144)
TRIGL SERPL-MCNC: 122 MG/DL (ref 0–149)
TSH SERPL DL<=0.005 MIU/L-ACNC: 3.84 UIU/ML (ref 0.45–4.5)
VLDLC SERPL CALC-MCNC: 22 MG/DL (ref 5–40)
WBC # BLD AUTO: 4 X10E3/UL (ref 3.4–10.8)

## 2022-05-23 ENCOUNTER — OFFICE VISIT (OUTPATIENT)
Dept: FAMILY MEDICINE CLINIC | Facility: CLINIC | Age: 72
End: 2022-05-23

## 2022-05-23 VITALS
OXYGEN SATURATION: 98 % | HEIGHT: 63 IN | WEIGHT: 162 LBS | HEART RATE: 82 BPM | DIASTOLIC BLOOD PRESSURE: 80 MMHG | SYSTOLIC BLOOD PRESSURE: 122 MMHG | TEMPERATURE: 97.4 F | BODY MASS INDEX: 28.7 KG/M2

## 2022-05-23 DIAGNOSIS — E55.9 AVITAMINOSIS D: ICD-10-CM

## 2022-05-23 DIAGNOSIS — Z12.31 ENCOUNTER FOR SCREENING MAMMOGRAM FOR MALIGNANT NEOPLASM OF BREAST: ICD-10-CM

## 2022-05-23 DIAGNOSIS — E78.2 MIXED HYPERLIPIDEMIA: Primary | ICD-10-CM

## 2022-05-23 DIAGNOSIS — E03.9 HYPOTHYROIDISM (ACQUIRED): ICD-10-CM

## 2022-05-23 PROBLEM — F43.20 ADJUSTMENT DISORDER: Status: ACTIVE | Noted: 2021-11-01

## 2022-05-23 PROCEDURE — 99213 OFFICE O/P EST LOW 20 MIN: CPT | Performed by: FAMILY MEDICINE

## 2022-05-23 NOTE — PROGRESS NOTES
Subjective   Fatoumata Perry is a 72 y.o. female with   Chief Complaint   Patient presents with   • Hypothyroidism   .    History of Present Illness   72-year-old white female with hypothyroidism here for further medical management.  Patient also with history of hyperlipidemia managing this status with dietary measures alone.  Fasting labs have been acquired prior to this visit.  Patient is using a homeopathic treatment for her thyroid given to her by a functional medicine physician in [facility].  Patient does use a host of supplements and vitamins.  She has use of Flexeril which is the only prescription product that she has available on an ongoing basis.  She has recently had an upper respiratory infection and has used Promethazine DM.  In general she feels well and has no acute complaints.  The following portions of the patient's history were reviewed and updated as appropriate: allergies, current medications, past family history, past medical history, past social history, past surgical history and problem list.    Review of Systems   Cardiovascular:        Hyperlipidemia   Endocrine:        Hypothyroidism       Objective     Vitals:    05/23/22 0958   BP: 122/80   Pulse: 82   Temp: 97.4 °F (36.3 °C)   SpO2: 98%       Recent Results (from the past 672 hour(s))   CBC w AUTO Differential    Collection Time: 05/16/22  9:16 AM    Specimen: Blood   Result Value Ref Range    WBC 4.0 3.4 - 10.8 x10E3/uL    RBC 4.56 3.77 - 5.28 x10E6/uL    Hemoglobin 13.5 11.1 - 15.9 g/dL    Hematocrit 40.6 34.0 - 46.6 %    MCV 89 79 - 97 fL    MCH 29.6 26.6 - 33.0 pg    MCHC 33.3 31.5 - 35.7 g/dL    RDW 13.5 11.7 - 15.4 %    Platelets 244 150 - 450 x10E3/uL    Neutrophil Rel % 59 Not Estab. %    Lymphocyte Rel % 31 Not Estab. %    Monocyte Rel % 6 Not Estab. %    Eosinophil Rel % 3 Not Estab. %    Basophil Rel % 1 Not Estab. %    Neutrophils Absolute 2.4 1.4 - 7.0 x10E3/uL    Lymphocytes Absolute 1.3 0.7 - 3.1 x10E3/uL     Monocytes Absolute 0.2 0.1 - 0.9 x10E3/uL    Eosinophils Absolute 0.1 0.0 - 0.4 x10E3/uL    Basophils Absolute 0.0 0.0 - 0.2 x10E3/uL    Immature Granulocyte Rel % 0 Not Estab. %    Immature Grans Absolute 0.0 0.0 - 0.1 x10E3/uL   TSH    Collection Time: 05/16/22  9:16 AM    Specimen: Blood   Result Value Ref Range    TSH 3.840 0.450 - 4.500 uIU/mL   Vitamin D 25 hydroxy    Collection Time: 05/16/22  9:16 AM    Specimen: Blood   Result Value Ref Range    25 Hydroxy, Vitamin D 92.1 30.0 - 100.0 ng/mL   Comprehensive metabolic panel    Collection Time: 05/16/22  9:16 AM    Specimen: Blood   Result Value Ref Range    Glucose 101 (H) 65 - 99 mg/dL    BUN 12 8 - 27 mg/dL    Creatinine 0.83 0.57 - 1.00 mg/dL    EGFR Result 75 >59 mL/min/1.73    BUN/Creatinine Ratio 14 12 - 28    Sodium 142 134 - 144 mmol/L    Potassium 4.3 3.5 - 5.2 mmol/L    Chloride 106 96 - 106 mmol/L    Total CO2 22 20 - 29 mmol/L    Calcium 9.3 8.7 - 10.3 mg/dL    Total Protein 6.5 6.0 - 8.5 g/dL    Albumin 4.2 3.7 - 4.7 g/dL    Globulin 2.3 1.5 - 4.5 g/dL    A/G Ratio 1.8 1.2 - 2.2    Total Bilirubin 0.6 0.0 - 1.2 mg/dL    Alkaline Phosphatase 78 44 - 121 IU/L    AST (SGOT) 20 0 - 40 IU/L    ALT (SGPT) 19 0 - 32 IU/L   Lipid panel    Collection Time: 05/16/22  9:16 AM    Specimen: Blood   Result Value Ref Range    Total Cholesterol 228 (H) 100 - 199 mg/dL    Triglycerides 122 0 - 149 mg/dL    HDL Cholesterol 57 >39 mg/dL    VLDL Cholesterol Deon 22 5 - 40 mg/dL    LDL Chol Calc (NIH) 149 (H) 0 - 99 mg/dL       Physical Exam  Vitals and nursing note reviewed.   Constitutional:       Appearance: Normal appearance. She is well-developed and well-groomed.   HENT:      Head: Normocephalic and atraumatic.   Neck:      Thyroid: No thyroid mass or thyromegaly.      Vascular: Normal carotid pulses. No carotid bruit.      Trachea: Trachea and phonation normal.   Cardiovascular:      Rate and Rhythm: Normal rate and regular rhythm.      Heart sounds: Normal  heart sounds. No murmur heard.    No friction rub. No gallop.   Pulmonary:      Effort: Pulmonary effort is normal. No respiratory distress.      Breath sounds: Normal breath sounds. No decreased breath sounds, wheezing, rhonchi or rales.   Musculoskeletal:      Cervical back: Neck supple.   Lymphadenopathy:      Cervical: No cervical adenopathy.   Skin:     General: Skin is warm and dry.      Findings: No rash.   Neurological:      Mental Status: She is alert and oriented to person, place, and time.   Psychiatric:         Attention and Perception: Attention and perception normal.         Mood and Affect: Mood and affect normal.         Speech: Speech normal.         Behavior: Behavior normal. Behavior is cooperative.         Thought Content: Thought content normal.         Cognition and Memory: Cognition and memory normal.         Judgment: Judgment normal.         Assessment & Plan   Diagnoses and all orders for this visit:    1. Mixed hyperlipidemia (Primary)  -     Lipid panel; Future  -     Comprehensive metabolic panel; Future  -     Vitamin D 25 hydroxy; Future  -     TSH; Future  -     CBC w AUTO Differential; Future    2. Avitaminosis D  -     Lipid panel; Future  -     Comprehensive metabolic panel; Future  -     Vitamin D 25 hydroxy; Future  -     TSH; Future  -     CBC w AUTO Differential; Future    3. Hypothyroidism (acquired)  -     Lipid panel; Future  -     Comprehensive metabolic panel; Future  -     Vitamin D 25 hydroxy; Future  -     TSH; Future  -     CBC w AUTO Differential; Future    4. Encounter for screening mammogram for malignant neoplasm of breast  -     Mammo Screening Bilateral With CAD; Future  -     Lipid panel; Future  -     Comprehensive metabolic panel; Future  -     Vitamin D 25 hydroxy; Future  -     TSH; Future  -     CBC w AUTO Differential; Future      Low-cholesterol dietary handout has been given with patient to further adjust her diet to bring LDL to a goal of 130 or less.   She will continue to see her functional physician with anticipation of repeat fasting labs in this office in 6 months.  Return in about 6 months (around 11/23/2022) for Recheck.

## 2022-06-03 ENCOUNTER — TRANSCRIBE ORDERS (OUTPATIENT)
Dept: ADMINISTRATIVE | Facility: HOSPITAL | Age: 72
End: 2022-06-03

## 2022-06-03 DIAGNOSIS — Z12.31 SCREENING MAMMOGRAM, ENCOUNTER FOR: Primary | ICD-10-CM

## 2022-06-09 NOTE — TELEPHONE ENCOUNTER
[facility]  Cornelia Barcenas MD  [phone]      Tillie Frankel  1984    45 y.o. female     Date of Visit: 6/9/2022    Chief Complaint: lesion/moles  Chief Complaint   Patient presents with    Skin Exam     Last seen: 5-2021 for skin check    History of Present Illness:    1, 2. Here for evaluation multiple asx pigmented lesions on the trunk and extremities, present for many years; no change in size/shape/color of any lesions; no bleeding lesions. No personal history of skin cancer. Grandfather with hx of BCC and melanoma. She had a dysplastic nevus on the back removed as a teenager. 3. She has a concerning pink lesion on the scalp - near the part. Asx. She was not aware of it. 4. F/u for acne. Using BP wash with some improvement. Not as bad. Breakouts along the lower face and jawline since 2019. Started pre-Covid so not associated or triggered by mask wearing. Has an IUD so does not have regular periods so she is not sure if there is any cyclical or hormonal correlation. She burns easily. No tanning beds. She wears sunscreen regularly but doesn't always reapply if she is out. Review of Systems:  Gen: Feels well, good sense of health. Skin: No changing moles or lesions. No new rashes. Past Medical History, Family History, Surgical History, Medications and Allergies reviewed. She has an almost 10 yo, a 4 yo daughter and a 15 yo son and 5 yo daughter.     Past Medical History:   Diagnosis Date    Heart abnormalities age 9    benign heart murmur    Thyroid nodule 4/15/2019    Varicosities     vulvar variscosities       Past Surgical History:   Procedure Laterality Date    [place] SONO GUIDE NEEDLE BIOPSY  04/2019    benign    THYROIDECTOMY Left 11/12/2021    LEFT HEMITHYROIDECTOMY performed by Vani Myers MD at [address] [place], [place] [address]way  age 13       No outpatient medications have been marked as taking for the 6/9/22 encounter (Office I spoke with this patient in regards to her labs we coded the exact same as last year so I am not sure if it is her deductible. She is going to check .    Visit) with Henry De Souza MD.       No Known Allergies    Physical Examination     Gen, well-appearing    FSE today    trunk and extremities with scattered brown macules and papules   Largest on the lower abdomen - pinkish-brown with central soft tan papule  Scar on the back clear  Frontal/crown of scalp at part - subtle shiny pink thin papule  Jawline and lower cheeks/chin with very few erythematous papules and macules    Assessment and Plan     1. Benign-appearing nevi and history of dysplastic nevus  2. Family history of melanoma  - educ re ABCD's of MM   educ sun protection sPF 30+  encouraged skin check yearly (sooner if indicated), self checks    3. Frontal/crown scalp - at part in hair - r/o BCC  - Shave biopsy performed after verbal consent obtained. Patient educated regarding risk of bleeding, infection, scar and educated on wound care. Skin cleansed with alcohol pad and site anesthetized with lido + epi. Aluminum chloride applied to site for hemostasis. Petrolatum ointment and bandage applied. Specimen bottle labeled with patient information and site and specimen sent to [facility].        4. Acne - jawline/hormonal - very mild  - cont BP wash-CeraVe foaming acne wash - ed risk of bleaching and irritation  -Call if worsening and can consider other treatment

## 2022-06-28 ENCOUNTER — HOSPITAL ENCOUNTER (OUTPATIENT)
Dept: MAMMOGRAPHY | Facility: HOSPITAL | Age: 72
Discharge: HOME | End: 2022-06-28
Admitting: FAMILY MEDICINE

## 2022-06-28 DIAGNOSIS — Z12.31 SCREENING MAMMOGRAM, ENCOUNTER FOR: ICD-10-CM

## 2022-06-28 PROCEDURE — 77067 SCR MAMMO BI INCL CAD: CPT

## 2022-06-28 PROCEDURE — 77063 BREAST TOMOSYNTHESIS BI: CPT

## 2022-09-14 DIAGNOSIS — M26.609 TMJ (TEMPOROMANDIBULAR JOINT DISORDER): ICD-10-CM

## 2022-09-14 RX ORDER — CYCLOBENZAPRINE HCL 10 MG
10 TABLET ORAL 3 TIMES DAILY PRN
Qty: 270 TABLET | Refills: 0 | Status: SHIPPED | OUTPATIENT
Start: 2022-09-14

## 2022-09-14 NOTE — TELEPHONE ENCOUNTER
Caller: Fatoumata Perry    Relationship: Self    Best call back number: [phone]     Requested Prescriptions:   Requested Prescriptions     Pending Prescriptions Disp Refills   • cyclobenzaprine (FLEXERIL) 10 MG tablet 270 tablet 0     Sig: Take 1 tablet by mouth 3 (Three) Times a Day As Needed for Muscle Spasms.        Pharmacy where request should be sent: [facility]ACY #[id number] - [place], [place] - [address] [phone] [facility] [phone] FX     Additional details provided by patient: PATIENT IS OUT OF MEDICATION    Does the patient have less than a 3 day supply:  [x] Yes  [] No    Simone Kent Rep   09/14/22 11:47 EDT

## 2023-01-11 ENCOUNTER — OFFICE VISIT (OUTPATIENT)
Dept: FAMILY MEDICINE CLINIC | Facility: CLINIC | Age: 73
End: 2023-01-11
Payer: MEDICARE

## 2023-01-11 VITALS
HEART RATE: 94 BPM | BODY MASS INDEX: 29.06 KG/M2 | TEMPERATURE: 98 F | SYSTOLIC BLOOD PRESSURE: 134 MMHG | WEIGHT: 164 LBS | HEIGHT: 63 IN | OXYGEN SATURATION: 96 % | DIASTOLIC BLOOD PRESSURE: 84 MMHG

## 2023-01-11 DIAGNOSIS — J60 BLACK LUNG: ICD-10-CM

## 2023-01-11 DIAGNOSIS — E78.2 MIXED HYPERLIPIDEMIA: Primary | ICD-10-CM

## 2023-01-11 DIAGNOSIS — E55.9 AVITAMINOSIS D: ICD-10-CM

## 2023-01-11 PROCEDURE — G0439 PPPS, SUBSEQ VISIT: HCPCS | Performed by: FAMILY MEDICINE

## 2023-01-11 PROCEDURE — 1159F MED LIST DOCD IN RCRD: CPT | Performed by: FAMILY MEDICINE

## 2023-01-11 PROCEDURE — 1170F FXNL STATUS ASSESSED: CPT | Performed by: FAMILY MEDICINE

## 2023-01-11 PROCEDURE — 1126F AMNT PAIN NOTED NONE PRSNT: CPT | Performed by: FAMILY MEDICINE

## 2023-01-11 NOTE — PROGRESS NOTES
The ABCs of the Annual Wellness Visit  Subsequent Medicare Wellness Visit    Subjective    Fatoumata Perry is a 72 y.o. female who presents for a Subsequent Medicare Wellness Visit.    The following portions of the patient's history were reviewed and   updated as appropriate: allergies, current medications, past family history, past medical history, past social history, past surgical history and problem list.    Compared to one year ago, the patient feels her physical   health is the same.    Compared to one year ago, the patient feels her mental   health is the same.    Recent Hospitalizations:  She was not admitted to the hospital during the last year.       Current Medical Providers:  Patient Care Team:  Cosme Cho DO as PCP - General    Outpatient Medications Prior to Visit   Medication Sig Dispense Refill   • cyclobenzaprine (FLEXERIL) 10 MG tablet Take 1 tablet by mouth 3 (Three) Times a Day As Needed for Muscle Spasms. 270 tablet 0   • Lysine HCl 500 MG tablet Take 1 tablet by mouth Daily.     • Magnesium 200 MG tablet Take 1 tablet by mouth Daily.     • Omega 3 1000 MG capsule Take 1,000 mg by mouth Daily.     • Red Yeast Rice 600 MG capsule Take 1 tablet by mouth Daily.     • Unable to find Take 1 each by mouth 3 (Three) Times a Day. Thyroid supplement; patient use to take prescribed thyroid med, switched to herbal supplement.     • vitamin C (ASCORBIC ACID) 250 MG tablet Take 250 mg by mouth Daily.     • vitamin D3 125 MCG (5000 UT) capsule capsule Take 2,000 Units by mouth Daily.     • promethazine-dextromethorphan (PROMETHAZINE-DM) 6.25-15 MG/5ML syrup Take 5 mL by mouth 4 (Four) Times a Day As Needed for Cough. 240 mL 0     No facility-administered medications prior to visit.       No opioid medication identified on active medication list. I have reviewed chart for other potential  high risk medication/s and harmful drug interactions in the elderly.          Aspirin is not on active medication  "list.  Aspirin use is not indicated based on review of current medical condition/s. Risk of harm outweighs potential benefits.  .    Patient Active Problem List   Diagnosis   • Avitaminosis D   • Postherpetic neuralgia   • Shingles outbreak   • Hypothyroidism (acquired)   • Osteopenia   • Mechanical low back pain   • Mixed hyperlipidemia   • Narrowing or closure of vagina   • Atrophic vaginitis   • Hyperchloremia   • Hypernatremia   • History of adenomatous polyp of colon   • Screen for colon cancer   • Adjustment disorder     Advance Care Planning  Advance Directive is not on file.  ACP discussion was held with the patient during this visit. Patient does not have an advance directive, information provided.     Objective    Vitals:    01/11/23 0850   BP: 134/84   Pulse: 94   Temp: 98 °F (36.7 °C)   SpO2: 96%   Weight: 74.4 kg (164 lb)   Height: 160 cm (63\")   PainSc: 0-No pain     Estimated body mass index is 29.05 kg/m² as calculated from the following:    Height as of this encounter: 160 cm (63\").    Weight as of this encounter: 74.4 kg (164 lb).    BMI is >= 25 and <30. (Overweight) The following options were offered after discussion;: exercise counseling/recommendations and nutrition counseling/recommendations      Does the patient have evidence of cognitive impairment? No    Lab Results   Component Value Date    CHLPL 194 01/03/2023    TRIG 84 01/03/2023    HDL 58 01/03/2023     (H) 01/03/2023    VLDL 15 01/03/2023        HEALTH RISK ASSESSMENT    Smoking Status:  Social History     Tobacco Use   Smoking Status Never   Smokeless Tobacco Never     Alcohol Consumption:  Social History     Substance and Sexual Activity   Alcohol Use No     Fall Risk Screen:    STEADI Fall Risk Assessment was completed, and patient is at LOW risk for falls.Assessment completed on:1/11/2023    Depression Screening:  PHQ-2/PHQ-9 Depression Screening 1/11/2023   Little Interest or Pleasure in Doing Things 0-->not at all "   Feeling Down, Depressed or Hopeless 0-->not at all   PHQ-9: Brief Depression Severity Measure Score 0       Health Habits and Functional and Cognitive Screening:  Functional & Cognitive Status 1/11/2023   Do you have difficulty preparing food and eating? No   Do you have difficulty bathing yourself, getting dressed or grooming yourself? No   Do you have difficulty using the toilet? No   Do you have difficulty moving around from place to place? No   Do you have trouble with steps or getting out of a bed or a chair? No   Current Diet Well Balanced Diet   Dental Exam Up to date   Eye Exam Not up to date   Exercise (times per week) 5 times per week   Current Exercises Include Walking   Current Exercise Activities Include -   Do you need help using the phone?  No   Are you deaf or do you have serious difficulty hearing?  No   Do you need help with transportation? No   Do you need help shopping? No   Do you need help preparing meals?  No   Do you need help with housework?  No   Do you need help with laundry? No   Do you need help taking your medications? No   Do you need help managing money? No   Do you ever drive or ride in a car without wearing a seat belt? No   Have you felt unusual stress, anger or loneliness in the last month? No   Who do you live with? Alone   If you need help, do you have trouble finding someone available to you? No   Have you been bothered in the last four weeks by sexual problems? -   Do you have difficulty concentrating, remembering or making decisions? No       Age-appropriate Screening Schedule:  Refer to the list below for future screening recommendations based on patient's age, sex and/or medical conditions. Orders for these recommended tests are listed in the plan section. The patient has been provided with a written plan.    Health Maintenance   Topic Date Due   • ZOSTER VACCINE (2 of 3) 10/07/2016   • DXA SCAN  05/03/2021   • LIPID PANEL  01/03/2024   • MAMMOGRAM  06/28/2024   • TDAP/TD  "VACCINES (2 - Td or Tdap) 09/10/2027   • INFLUENZA VACCINE  Completed                CMS Preventative Services Quick Reference  Risk Factors Identified During Encounter  Fall Risk-High or Moderate: Discussed Fall Prevention in the home  Immunizations Discussed/Encouraged: Influenza and Shingrix  Inactivity/Sedentary: Patient was advised to exercise at least 150 minutes a week per CDC recommendations.  Dental Screening Recommended  Vision Screening Recommended  The above risks/problems have been discussed with the patient.  Pertinent information has been shared with the patient in the After Visit Summary.  An After Visit Summary and PPPS were made available to the patient.    Follow Up:   Next Medicare Wellness visit to be scheduled in 1 year.       Additional E&M Note during same encounter follows:  Patient has multiple medical problems which are significant and separately identifiable that require additional work above and beyond the Medicare Wellness Visit.      Chief Complaint  Annual Exam (SUB AWV)    Subjective        HPI  Fatoumata Perry is also being seen today for 72-year-old white female with known history of hyperlipidemia here for further medical management.  Patient has been trying to control this with dietary measures as well as red yeast rice.  Fasting labs have been acquired prior to this visit.  Patient without significant past medical history And is currently using no prescriptive medications.  She is up-to-date with mammography as well as colonoscopy.  She is currently doing well and has no acute complaints.    Review of Systems   Cardiovascular:        Hyperlipidemia   All other systems reviewed and are negative.      Objective   Vital Signs:  /84   Pulse 94   Temp 98 °F (36.7 °C)   Ht 160 cm (63\")   Wt 74.4 kg (164 lb)   SpO2 96%   BMI 29.05 kg/m²     Physical Exam  Vitals and nursing note reviewed.   Constitutional:       Appearance: Normal appearance. She is well-developed and " well-groomed.   HENT:      Head: Normocephalic and atraumatic.   Neck:      Thyroid: No thyroid mass or thyromegaly.      Vascular: Normal carotid pulses. No carotid bruit.      Trachea: Trachea and phonation normal.   Cardiovascular:      Rate and Rhythm: Normal rate and regular rhythm.      Heart sounds: Normal heart sounds. No murmur heard.    No friction rub. No gallop.   Pulmonary:      Effort: Pulmonary effort is normal. No respiratory distress.      Breath sounds: Normal breath sounds. No decreased breath sounds, wheezing, rhonchi or rales.   Musculoskeletal:      Cervical back: Neck supple.   Lymphadenopathy:      Cervical: No cervical adenopathy.   Skin:     General: Skin is warm and dry.      Findings: No rash.   Neurological:      Mental Status: She is alert and oriented to person, place, and time.   Psychiatric:         Attention and Perception: Attention and perception normal.         Mood and Affect: Mood and affect normal.         Speech: Speech normal.         Behavior: Behavior normal. Behavior is cooperative.         Thought Content: Thought content normal.         Cognition and Memory: Cognition and memory normal.         Judgment: Judgment normal.        No visits with results within 6 Week(s) from this visit.   Latest known visit with results is:   Results Encounter on 11/30/2022   Component Date Value Ref Range Status   • Total Cholesterol 01/03/2023 194  0 - 200 mg/dL Final    Comment: Cholesterol Reference Ranges  (U.S. Department of Health and Human Services ATP III  Classifications)  Desirable          <200 mg/dL  Borderline High    200-239 mg/dL  High Risk          >240 mg/dL  Triglyceride Reference Ranges  (U.S. Department of Health and Human Services ATP III  Classifications)  Normal           <150 mg/dL  Borderline High  150-199 mg/dL  High             200-499 mg/dL  Very High        >500 mg/dL  HDL Reference Ranges  (U.S. Department of Health and Human Services ATP  III  Classifications)  Low     <40 mg/dl (major risk factor for CHD)  High    >60 mg/dl ('negative' risk factor for CHD)  LDL Reference Ranges  (U.S. Department of Health and Human Services ATP III  Classifications)  Optimal          <100 mg/dL  Near Optimal     100-129 mg/dL  Borderline High  130-159 mg/dL  High             160-189 mg/dL  Very High        >189 mg/dL     • Triglycerides 01/03/2023 84  0 - 150 mg/dL Final   • HDL Cholesterol 01/03/2023 58  40 - 60 mg/dL Final   • VLDL Cholesterol Deon 01/03/2023 15  5 - 40 mg/dL Final   • LDL Chol Calc (NIH) 01/03/2023 121 (H)  0 - 100 mg/dL Final   • Glucose 01/03/2023 93  65 - 99 mg/dL Final   • BUN 01/03/2023 16  8 - 23 mg/dL Final   • Creatinine 01/03/2023 0.75  0.57 - 1.00 mg/dL Final   • EGFR Result 01/03/2023 84.7  >60.0 mL/min/1.73 Final    Comment: National Kidney Foundation and American Society of  Nephrology (ASN) Task Force recommended calculation based  on the Chronic Kidney Disease Epidemiology Collaboration  (CKD-EPI) equation refit without adjustment for race.  GFR Normal >60  Chronic Kidney Disease <60  Kidney Failure <15  The GFR formula is only valid for adults with stable renal  function between ages 18 and 70.     • BUN/Creatinine Ratio 01/03/2023 21.3  7.0 - 25.0 Final   • Sodium 01/03/2023 143  136 - 145 mmol/L Final   • Potassium 01/03/2023 4.5  3.5 - 5.2 mmol/L Final   • Chloride 01/03/2023 107  98 - 107 mmol/L Final   • Total CO2 01/03/2023 28.2  22.0 - 29.0 mmol/L Final   • Calcium 01/03/2023 8.9  8.6 - 10.5 mg/dL Final   • Total Protein 01/03/2023 6.4  6.0 - 8.5 g/dL Final   • Albumin 01/03/2023 4.3  3.5 - 5.2 g/dL Final   • Globulin 01/03/2023 2.1  gm/dL Final   • A/G Ratio 01/03/2023 2.0  g/dL Final   • Total Bilirubin 01/03/2023 0.3  0.0 - 1.2 mg/dL Final   • Alkaline Phosphatase 01/03/2023 79  39 - 117 U/L Final   • AST (SGOT) 01/03/2023 17  1 - 32 U/L Final   • ALT (SGPT) 01/03/2023 12  1 - 33 U/L Final   • 25 Hydroxy, Vitamin D  01/03/2023 171.0 (H)  30.0 - 100.0 ng/ml Final    Comment: Results may be falsely increased if patient taking Biotin.  Reference Range for Total Vitamin D 25(OH)  Deficiency <20.0 ng/mL  Insufficiency 21-29 ng/mL  Sufficiency  ng/mL  Toxicity >100 ng/ml     • TSH 01/03/2023 4.150  0.270 - 4.200 uIU/mL Final   • WBC 01/03/2023 4.50  3.40 - 10.80 10*3/mm3 Final   • RBC 01/03/2023 4.54  3.77 - 5.28 10*6/mm3 Final   • Hemoglobin 01/03/2023 13.9  12.0 - 15.9 g/dL Final   • Hematocrit 01/03/2023 41.7  34.0 - 46.6 % Final   • MCV 01/03/2023 91.9  79.0 - 97.0 fL Final   • MCH 01/03/2023 30.6  26.6 - 33.0 pg Final   • MCHC 01/03/2023 33.3  31.5 - 35.7 g/dL Final   • RDW 01/03/2023 13.1  12.3 - 15.4 % Final   • Platelets 01/03/2023 265  140 - 450 10*3/mm3 Final   • Neutrophil Rel % 01/03/2023 65.1  42.7 - 76.0 % Final   • Lymphocyte Rel % 01/03/2023 26.2  19.6 - 45.3 % Final   • Monocyte Rel % 01/03/2023 4.9 (L)  5.0 - 12.0 % Final   • Eosinophil Rel % 01/03/2023 3.1  0.3 - 6.2 % Final   • Basophil Rel % 01/03/2023 0.7  0.0 - 1.5 % Final   • Neutrophils Absolute 01/03/2023 2.93  1.70 - 7.00 10*3/mm3 Final   • Lymphocytes Absolute 01/03/2023 1.18  0.70 - 3.10 10*3/mm3 Final   • Monocytes Absolute 01/03/2023 0.22  0.10 - 0.90 10*3/mm3 Final   • Eosinophils Absolute 01/03/2023 0.14  0.00 - 0.40 10*3/mm3 Final   • Basophils Absolute 01/03/2023 0.03  0.00 - 0.20 10*3/mm3 Final   • Immature Granulocyte Rel % 01/03/2023 0.0  0.0 - 0.5 % Final   • Immature Grans Absolute 01/03/2023 0.00  0.00 - 0.05 10*3/mm3 Final   • nRBC 01/03/2023 0.0  0.0 - 0.2 /100 WBC Final         The following data was reviewed by: Cosme Cho DO on 01/11/2023:  Common labs    Common Labs 5/16/22 5/16/22 5/16/22 1/3/23 1/3/23 1/3/23    0916 0916 0916 1000 1000 1000   Glucose  101 (A)   93    BUN  12   16    Creatinine  0.83   0.75    Sodium  142   143    Potassium  4.3   4.5    Chloride  106   107    Calcium  9.3   8.9    Total Protein  6.5   6.4     Albumin  4.2   4.3    Total Bilirubin  0.6   0.3    Alkaline Phosphatase  78   79    AST (SGOT)  20   17    ALT (SGPT)  19   12    WBC 4.0     4.50   Hemoglobin 13.5     13.9   Hematocrit 40.6     41.7   Platelets 244     265   Total Cholesterol   228 (A) 194     Triglycerides   122 84     HDL Cholesterol   57 58     LDL Cholesterol    149 (A) 121 (A)     (A) Abnormal value       Comments are available for some flowsheets but are not being displayed.           Data reviewed: none           Assessment and Plan   Diagnoses and all orders for this visit:    1. Mixed hyperlipidemia (Primary)    2. Avitaminosis D    3. Black lung (HCC)  -     Ambulatory Referral to Pulmonology           I spent 30 minutes caring for Fatoumata on this date of service. This time includes time spent by me in the following activities:preparing for the visit, reviewing tests, obtaining and/or reviewing a separately obtained history, performing a medically appropriate examination and/or evaluation , counseling and educating the patient/family/caregiver, ordering medications, tests, or procedures, referring and communicating with other health care professionals , documenting information in the medical record, independently interpreting results and communicating that information with the patient/family/caregiver and care coordination  Follow Up   Return in about 6 months (around 7/11/2023) for Recheck.  Patient was given instructions and counseling regarding her condition or for health maintenance advice. Please see specific information pulled into the AVS if appropriate.

## 2023-05-03 ENCOUNTER — TRANSCRIBE ORDERS (OUTPATIENT)
Dept: ADMINISTRATIVE | Facility: HOSPITAL | Age: 73
End: 2023-05-03
Payer: MEDICARE

## 2023-05-03 DIAGNOSIS — R06.00 DYSPNEA, UNSPECIFIED TYPE: Primary | ICD-10-CM

## 2023-05-30 ENCOUNTER — TRANSCRIBE ORDERS (OUTPATIENT)
Dept: ADMINISTRATIVE | Facility: HOSPITAL | Age: 73
End: 2023-05-30

## 2023-05-30 DIAGNOSIS — Z12.31 ENCOUNTER FOR SCREENING MAMMOGRAM FOR MALIGNANT NEOPLASM OF BREAST: Primary | ICD-10-CM

## 2023-07-19 PROBLEM — E87.0 HYPERNATREMIA: Status: RESOLVED | Noted: 2021-06-21 | Resolved: 2023-07-19

## 2023-07-19 PROBLEM — E87.8 HYPERCHLOREMIA: Status: RESOLVED | Noted: 2021-06-21 | Resolved: 2023-07-19

## 2023-07-27 ENCOUNTER — TELEPHONE (OUTPATIENT)
Dept: FAMILY MEDICINE CLINIC | Facility: CLINIC | Age: 73
End: 2023-07-27

## 2023-07-27 RX ORDER — SULFACETAMIDE SODIUM 100 MG/ML
2 SOLUTION/ DROPS OPHTHALMIC 4 TIMES DAILY
Qty: 10 ML | Refills: 0 | Status: SHIPPED | OUTPATIENT
Start: 2023-07-27

## 2023-07-27 NOTE — TELEPHONE ENCOUNTER
I sent an eyedrop for her-she should be using a hot compress over that eye is much as she can to draw that stuff out.

## 2023-07-27 NOTE — TELEPHONE ENCOUNTER
Caller: Fatoumata Perry    Relationship: Self    Best call back number: [phone]    What medication are you requesting: EYE DROPS     What are your current symptoms: STYE ON RIGHT EYE     How long have you been experiencing symptoms: 2 DAYS     Have you had these symptoms before:    [] Yes  [x] No    Have you been treated for these symptoms before:   [] Yes  [x] No    If a prescription is needed, what is your preferred pharmacy and phone number: [facility]/PHARMACY #[id number] - [place], [place] - [address] [phone] [facility] [phone] FX     Additional notes: PATIENT HAS A STYE FOR THE FIRST TIME AND WOULD LIKE TO KNOW IF THERE IS AN EYEDROP THAT CAN HELP.

## 2023-08-21 ENCOUNTER — TRANSCRIBE ORDERS (OUTPATIENT)
Dept: ADMINISTRATIVE | Facility: HOSPITAL | Age: 73
End: 2023-08-21
Payer: MEDICARE

## 2023-08-21 DIAGNOSIS — R91.1 LUNG NODULE: Primary | ICD-10-CM

## 2023-10-16 ENCOUNTER — TELEPHONE (OUTPATIENT)
Dept: FAMILY MEDICINE CLINIC | Facility: CLINIC | Age: 73
End: 2023-10-16

## 2023-10-16 ENCOUNTER — OFFICE VISIT (OUTPATIENT)
Dept: FAMILY MEDICINE CLINIC | Facility: CLINIC | Age: 73
End: 2023-10-16
Payer: MEDICARE

## 2023-10-16 VITALS
HEIGHT: 63 IN | OXYGEN SATURATION: 99 % | DIASTOLIC BLOOD PRESSURE: 80 MMHG | HEART RATE: 82 BPM | BODY MASS INDEX: 29.23 KG/M2 | SYSTOLIC BLOOD PRESSURE: 150 MMHG | TEMPERATURE: 97.8 F | WEIGHT: 165 LBS

## 2023-10-16 DIAGNOSIS — J06.9 UPPER RESPIRATORY TRACT INFECTION, UNSPECIFIED TYPE: Primary | ICD-10-CM

## 2023-10-16 DIAGNOSIS — Z11.52 ENCOUNTER FOR SCREENING FOR COVID-19: ICD-10-CM

## 2023-10-16 LAB
EXPIRATION DATE: NORMAL
FLUAV AG UPPER RESP QL IA.RAPID: NOT DETECTED
FLUBV AG UPPER RESP QL IA.RAPID: NOT DETECTED
INTERNAL CONTROL: NORMAL
Lab: NORMAL
SARS-COV-2 AG UPPER RESP QL IA.RAPID: NOT DETECTED

## 2023-10-16 PROCEDURE — 96372 THER/PROPH/DIAG INJ SC/IM: CPT | Performed by: NURSE PRACTITIONER

## 2023-10-16 PROCEDURE — 87428 SARSCOV & INF VIR A&B AG IA: CPT | Performed by: NURSE PRACTITIONER

## 2023-10-16 PROCEDURE — 99213 OFFICE O/P EST LOW 20 MIN: CPT | Performed by: NURSE PRACTITIONER

## 2023-10-16 RX ORDER — AMOXICILLIN AND CLAVULANATE POTASSIUM 875; 125 MG/1; MG/1
1 TABLET, FILM COATED ORAL 2 TIMES DAILY
Qty: 14 TABLET | Refills: 0 | Status: SHIPPED | OUTPATIENT
Start: 2023-10-16 | End: 2023-10-23

## 2023-10-16 RX ORDER — DEXTROMETHORPHAN HYDROBROMIDE AND PROMETHAZINE HYDROCHLORIDE 15; 6.25 MG/5ML; MG/5ML
5 SYRUP ORAL 4 TIMES DAILY PRN
Qty: 120 ML | Refills: 0 | Status: SHIPPED | OUTPATIENT
Start: 2023-10-16

## 2023-10-16 RX ORDER — TRIAMCINOLONE ACETONIDE 40 MG/ML
40 INJECTION, SUSPENSION INTRA-ARTICULAR; INTRAMUSCULAR ONCE
Status: COMPLETED | OUTPATIENT
Start: 2023-10-16 | End: 2023-10-16

## 2023-10-16 RX ADMIN — TRIAMCINOLONE ACETONIDE 40 MG: 40 INJECTION, SUSPENSION INTRA-ARTICULAR; INTRAMUSCULAR at 15:55

## 2023-10-16 NOTE — TELEPHONE ENCOUNTER
Caller: Fatoumata Perry    Relationship: Self    Best call back number: [phone]    What medication are you requesting:     What are your current symptoms: HEADACHE, COUGH AT NIGHT, FOREHEAD AND CHEEKS PAINFUL, GREEN NASAL DISCHARGE    How long have you been experiencing symptoms: 6 DAYS    Have you had these symptoms before:    [x] Yes  [] No    Have you been treated for these symptoms before:   [x] Yes  [] No    If a prescription is needed, what is your preferred pharmacy and phone number: [facility]/PHARMACY #[id number] - [place], [place] - [address] - [phone] [facility] [phone] FX     Additional notes:

## 2023-10-16 NOTE — PROGRESS NOTES
"Chief Complaint   Patient presents with    Cough    Headache       Upper Respiratory Infection: Patient complains of symptoms of a URI. Symptoms include congestion, cough, and sore throat. Onset of symptoms was 6 days ago, gradually worsening since that time. She also c/o achiness, congestion, headache described as pressure, no  fever, non productive cough, sinus pressure, and sore throat for the past 6 days .  She is drinking moderate amounts of fluids. Evaluation to date: none. Treatment to date: antihistamines.  Ill contacts at home or school or work discussed.  None.      The following portions of the patient's history were reviewed and updated as appropriate: allergies, current medications, past family history, past medical history, past social history, past surgical history and problem list.        Vitals:    10/16/23 1515   BP: 150/80   Pulse: 82   Temp: 97.8 °F (36.6 °C)   SpO2: 99%   Weight: 74.8 kg (165 lb)   Height: 160 cm (62.99\")     Gen: Ill appearing, alert, coughing in exam room, masked  Head:  Maxillary sinus tenderness  Ears: TM's normal without redness  Nose:  Congestion  Throat:  Red without exudate, some drainage  Neck: Tender cervical lymph nodes  Lung: Decreased air movement in bases, regular RR  Heart: RR without murmur  Skin: No rash    POCT SARS-CoV-2 Antigen ODALIS + Flu (10/16/2023 15:56)    Assessment & Plan   Diagnoses and all orders for this visit:    1. Upper respiratory tract infection, unspecified type (Primary)  -     amoxicillin-clavulanate (AUGMENTIN) 875-125 MG per tablet; Take 1 tablet by mouth 2 (Two) Times a Day for 7 days.  Dispense: 14 tablet; Refill: 0  -     triamcinolone acetonide (KENALOG-40) injection 40 mg  -     promethazine-dextromethorphan (PROMETHAZINE-DM) 6.25-15 MG/5ML syrup; Take 5 mL by mouth 4 (Four) Times a Day As Needed for Cough.  Dispense: 120 mL; Refill: 0    2. Encounter for screening for COVID-19  -     POCT SARS-CoV-2 Antigen ODALIS + Flu         "       Tylenol or Advil as needed for pain, fever, muscle aches  Plenty of fluids  Hand washing discussed  Off work or school note given if needed.  Warm tea for throat.  Pros and cons of antibiotic use discussed.  Instructed to notify us if symptoms worsen or do not improve.        Patient was wearing face mask when I entered the room and throughout our encounter. Protective equipment was worn throughout this patient encounter including a face mask.  Hand hygiene was performed before donning protective equipment and after removal when leaving the room.     Ramandeep Meza, [place]  Family Practice  [facility] [place]

## 2024-01-05 DIAGNOSIS — E55.9 AVITAMINOSIS D: ICD-10-CM

## 2024-01-05 DIAGNOSIS — E78.2 MIXED HYPERLIPIDEMIA: ICD-10-CM

## 2024-01-08 LAB
25(OH)D3+25(OH)D2 SERPL-MCNC: 111 NG/ML (ref 30–100)
ALBUMIN SERPL-MCNC: 4.4 G/DL (ref 3.5–5.2)
ALBUMIN/GLOB SERPL: 1.8 G/DL
ALP SERPL-CCNC: 76 U/L (ref 39–117)
ALT SERPL-CCNC: 13 U/L (ref 1–33)
AST SERPL-CCNC: 14 U/L (ref 1–32)
BASOPHILS # BLD AUTO: 0.03 10*3/MM3 (ref 0–0.2)
BASOPHILS NFR BLD AUTO: 0.6 % (ref 0–1.5)
BILIRUB SERPL-MCNC: 0.5 MG/DL (ref 0–1.2)
BUN SERPL-MCNC: 10 MG/DL (ref 8–23)
BUN/CREAT SERPL: 12 (ref 7–25)
CALCIUM SERPL-MCNC: 9.5 MG/DL (ref 8.6–10.5)
CHLORIDE SERPL-SCNC: 103 MMOL/L (ref 98–107)
CHOLEST SERPL-MCNC: 213 MG/DL (ref 0–200)
CO2 SERPL-SCNC: 27.1 MMOL/L (ref 22–29)
CREAT SERPL-MCNC: 0.83 MG/DL (ref 0.57–1)
EGFRCR SERPLBLD CKD-EPI 2021: 74.5 ML/MIN/1.73
EOSINOPHIL # BLD AUTO: 0.14 10*3/MM3 (ref 0–0.4)
EOSINOPHIL NFR BLD AUTO: 3 % (ref 0.3–6.2)
ERYTHROCYTE [DISTWIDTH] IN BLOOD BY AUTOMATED COUNT: 13 % (ref 12.3–15.4)
GLOBULIN SER CALC-MCNC: 2.4 GM/DL
GLUCOSE SERPL-MCNC: 91 MG/DL (ref 65–99)
HCT VFR BLD AUTO: 42.2 % (ref 34–46.6)
HDLC SERPL-MCNC: 61 MG/DL (ref 40–60)
HGB BLD-MCNC: 14 G/DL (ref 12–15.9)
IMM GRANULOCYTES # BLD AUTO: 0.03 10*3/MM3 (ref 0–0.05)
IMM GRANULOCYTES NFR BLD AUTO: 0.6 % (ref 0–0.5)
LDLC SERPL CALC-MCNC: 126 MG/DL (ref 0–100)
LYMPHOCYTES # BLD AUTO: 1.09 10*3/MM3 (ref 0.7–3.1)
LYMPHOCYTES NFR BLD AUTO: 23.2 % (ref 19.6–45.3)
MCH RBC QN AUTO: 29.4 PG (ref 26.6–33)
MCHC RBC AUTO-ENTMCNC: 33.2 G/DL (ref 31.5–35.7)
MCV RBC AUTO: 88.7 FL (ref 79–97)
MONOCYTES # BLD AUTO: 0.16 10*3/MM3 (ref 0.1–0.9)
MONOCYTES NFR BLD AUTO: 3.4 % (ref 5–12)
NEUTROPHILS # BLD AUTO: 3.25 10*3/MM3 (ref 1.7–7)
NEUTROPHILS NFR BLD AUTO: 69.2 % (ref 42.7–76)
NRBC BLD AUTO-RTO: 0 /100 WBC (ref 0–0.2)
PLATELET # BLD AUTO: 309 10*3/MM3 (ref 140–450)
POTASSIUM SERPL-SCNC: 3.9 MMOL/L (ref 3.5–5.2)
PROT SERPL-MCNC: 6.8 G/DL (ref 6–8.5)
RBC # BLD AUTO: 4.76 10*6/MM3 (ref 3.77–5.28)
SODIUM SERPL-SCNC: 141 MMOL/L (ref 136–145)
TRIGL SERPL-MCNC: 148 MG/DL (ref 0–150)
TSH SERPL DL<=0.005 MIU/L-ACNC: 6.09 UIU/ML (ref 0.27–4.2)
VLDLC SERPL CALC-MCNC: 26 MG/DL (ref 5–40)
WBC # BLD AUTO: 4.7 10*3/MM3 (ref 3.4–10.8)

## 2024-01-24 ENCOUNTER — OFFICE VISIT (OUTPATIENT)
Dept: FAMILY MEDICINE CLINIC | Facility: CLINIC | Age: 74
End: 2024-01-24
Payer: MEDICARE

## 2024-01-24 VITALS
WEIGHT: 165 LBS | OXYGEN SATURATION: 96 % | DIASTOLIC BLOOD PRESSURE: 80 MMHG | TEMPERATURE: 97.5 F | BODY MASS INDEX: 29.23 KG/M2 | HEART RATE: 80 BPM | SYSTOLIC BLOOD PRESSURE: 140 MMHG | HEIGHT: 63 IN

## 2024-01-24 DIAGNOSIS — E78.2 MIXED HYPERLIPIDEMIA: ICD-10-CM

## 2024-01-24 DIAGNOSIS — Z00.00 MEDICARE ANNUAL WELLNESS VISIT, SUBSEQUENT: Primary | ICD-10-CM

## 2024-01-24 DIAGNOSIS — E55.9 AVITAMINOSIS D: ICD-10-CM

## 2024-01-24 DIAGNOSIS — E03.9 HYPOTHYROIDISM (ACQUIRED): ICD-10-CM

## 2024-01-24 NOTE — PROGRESS NOTES
The ABCs of the Annual Wellness Visit  Subsequent Medicare Wellness Visit    Subjective    Fatoumata Perry is a 74 y.o. female who presents for a Subsequent Medicare Wellness Visit.    The following portions of the patient's history were reviewed and   updated as appropriate: allergies, current medications, past family history, past medical history, past social history, past surgical history, and problem list.    Compared to one year ago, the patient feels her physical   health is the same.    Compared to one year ago, the patient feels her mental   health is the same.    Recent Hospitalizations:  She was not admitted to the hospital during the last year.       Current Medical Providers:  Patient Care Team:  Cosme Cho,  as PCP - General    Outpatient Medications Prior to Visit   Medication Sig Dispense Refill    clobetasol (TEMOVATE) 0.05 % external solution APPLY 4-5 DROPS NIGHTLY FOR 4 WEEKS THEN STOP FOR TWO WEEKS      hydrocortisone 2.5 % ointment APPLY TO SPOT ON RIGHT LEG TWICE A DAY X 2 WEEKS      ketoconazole (NIZORAL) 2 % shampoo APPLY TO SCALP, LEAVE ON FOR 5 MINUTES THEN RINSE AND WASH IN SHOWER WITH SHAMPOO 2 TIMES A WEEK      Lysine HCl 500 MG tablet Take 1 tablet by mouth Daily.      Magnesium 200 MG tablet Take 1 tablet by mouth Daily.      Omega 3 1000 MG capsule Take 1,000 mg by mouth Daily.      Red Yeast Rice 600 MG capsule Take 1 capsule by mouth Daily.      Unable to find Take 1 each by mouth 3 (Three) Times a Day. Thyroid supplement; patient use to take prescribed thyroid med, switched to herbal supplement.      vitamin C (ASCORBIC ACID) 250 MG tablet Take 1 tablet by mouth Daily.      vitamin D3 125 MCG (5000 UT) capsule capsule Take 2,000 Units by mouth Daily.      promethazine-dextromethorphan (PROMETHAZINE-DM) 6.25-15 MG/5ML syrup Take 5 mL by mouth 4 (Four) Times a Day As Needed for Cough. 120 mL 0    sulfacetamide (Bleph-10) 10 % ophthalmic solution Apply 2 drops to eye(s) as  "directed by provider 4 (Four) Times a Day. 10 mL 0     No facility-administered medications prior to visit.       No opioid medication identified on active medication list. I have reviewed chart for other potential  high risk medication/s and harmful drug interactions in the elderly.        Aspirin is not on active medication list.  Aspirin use is not indicated based on review of current medical condition/s. Risk of harm outweighs potential benefits.  .    Patient Active Problem List   Diagnosis    Avitaminosis D    Postherpetic neuralgia    Shingles outbreak    Hypothyroidism (acquired)    Osteopenia    Mechanical low back pain    Mixed hyperlipidemia    Narrowing or closure of vagina    Atrophic vaginitis    History of adenomatous polyp of colon    Screen for colon cancer    Adjustment disorder     Advance Care Planning   Advance Care Planning     Advance Directive is not on file.  ACP discussion was held with the patient during this visit. Patient does not have an advance directive, information provided.     Objective    Vitals:    01/24/24 0832   BP: 140/80   Pulse: 80   Temp: 97.5 °F (36.4 °C)   SpO2: 96%   Weight: 74.8 kg (165 lb)   Height: 160 cm (62.99\")   PainSc: 0-No pain     Estimated body mass index is 29.24 kg/m² as calculated from the following:    Height as of this encounter: 160 cm (62.99\").    Weight as of this encounter: 74.8 kg (165 lb).    BMI is >= 25 and <30. (Overweight) The following options were offered after discussion;: exercise counseling/recommendations and nutrition counseling/recommendations      Does the patient have evidence of cognitive impairment? No    Lab Results   Component Value Date    CHLPL 213 (H) 01/08/2024    TRIG 148 01/08/2024    HDL 61 (H) 01/08/2024     (H) 01/08/2024    VLDL 26 01/08/2024        HEALTH RISK ASSESSMENT    Smoking Status:  Social History     Tobacco Use   Smoking Status Never   Smokeless Tobacco Never     Alcohol Consumption:  Social History "     Substance and Sexual Activity   Alcohol Use No     Fall Risk Screen:    SAMADI Fall Risk Assessment was completed, and patient is at LOW risk for falls.Assessment completed on:2024    Depression Screenin/24/2024     8:38 AM   PHQ-2/PHQ-9 Depression Screening   Little Interest or Pleasure in Doing Things 0-->not at all   Feeling Down, Depressed or Hopeless 0-->not at all   PHQ-9: Brief Depression Severity Measure Score 0       Health Habits and Functional and Cognitive Screenin/24/2024     8:38 AM   Functional & Cognitive Status   Do you have difficulty preparing food and eating? No   Do you have difficulty bathing yourself, getting dressed or grooming yourself? No   Do you have difficulty using the toilet? No   Do you have difficulty moving around from place to place? No   Do you have trouble with steps or getting out of a bed or a chair? No   Current Diet Well Balanced Diet   Dental Exam Up to date   Eye Exam Not up to date   Exercise (times per week) 3 times per week   Current Exercises Include Walking;Dancing   Do you need help using the phone?  No   Are you deaf or do you have serious difficulty hearing?  No   Do you need help to go to places out of walking distance? No   Do you need help shopping? No   Do you need help preparing meals?  No   Do you need help with housework?  No   Do you need help with laundry? No   Do you need help taking your medications? No   Do you need help managing money? No   Do you ever drive or ride in a car without wearing a seat belt? No   Have you felt unusual stress, anger or loneliness in the last month? No   Who do you live with? Alone   If you need help, do you have trouble finding someone available to you? No   Have you been bothered in the last four weeks by sexual problems? No   Do you have difficulty concentrating, remembering or making decisions? No       Age-appropriate Screening Schedule:  Refer to the list below for future screening  recommendations based on patient's age, sex and/or medical conditions. Orders for these recommended tests are listed in the plan section. The patient has been provided with a written plan.    Health Maintenance   Topic Date Due    HEPATITIS C SCREENING  Never done    ZOSTER VACCINE (2 of 3) 10/07/2016    DXA SCAN  05/03/2021    COVID-19 Vaccine (5 - 2023-24 season) 09/01/2023    BMI FOLLOWUP  01/11/2024    LIPID PANEL  01/08/2025    ANNUAL WELLNESS VISIT  01/24/2025    MAMMOGRAM  06/30/2025    TDAP/TD VACCINES (2 - Td or Tdap) 09/10/2027    COLORECTAL CANCER SCREENING  03/28/2032    INFLUENZA VACCINE  Completed    Pneumococcal Vaccine 65+  Completed                  CMS Preventative Services Quick Reference  Risk Factors Identified During Encounter  Immunizations Discussed/Encouraged: Influenza and Shingrix  Dental Screening Recommended  Vision Screening Recommended  The above risks/problems have been discussed with the patient.  Pertinent information has been shared with the patient in the After Visit Summary.  An After Visit Summary and PPPS were made available to the patient.    Follow Up:   Next Medicare Wellness visit to be scheduled in 1 year.       Additional E&M Note during same encounter follows:  Patient has multiple medical problems which are significant and separately identifiable that require additional work above and beyond the Medicare Wellness Visit.      Chief Complaint  Medicare Wellness-subsequent    Subjective        HPI  Fatoumata Perry is also being seen today for 74-year-old female here for subsequent Medicare wellness visit as well as for further medical management of several different issues.  Patient has history of hyperlipidemia and has been trying to control this with dietary measures alone.  She has no family history of cardiovascular disease and has not had issues with diabetes, hypertension or smoking.  She has no personal cardiovascular event.  She also has history of  "hypothyroidism and in the past was subclinical.  She is currently on nothing but supplements and vitamins.  All supplements and vitamins are used appropriately and are without side effects.  Fasting labs have been acquired prior to this visit.    Review of Systems   Cardiovascular:         Hyperlipidemia   Endocrine:        Overweight, vitamin D deficiency, hypothyroidism       Objective   Vital Signs:  /80   Pulse 80   Temp 97.5 °F (36.4 °C)   Ht 160 cm (62.99\")   Wt 74.8 kg (165 lb)   SpO2 96%   BMI 29.24 kg/m²     Physical Exam  Vitals and nursing note reviewed.   Constitutional:       Appearance: Normal appearance. She is well-developed and well-groomed.   HENT:      Head: Normocephalic and atraumatic.   Neck:      Thyroid: No thyroid mass or thyromegaly.      Vascular: Normal carotid pulses. No carotid bruit.      Trachea: Trachea and phonation normal.   Cardiovascular:      Rate and Rhythm: Normal rate and regular rhythm.      Heart sounds: Normal heart sounds. No murmur heard.     No friction rub. No gallop.   Pulmonary:      Effort: Pulmonary effort is normal. No respiratory distress.      Breath sounds: Normal breath sounds. No decreased breath sounds, wheezing, rhonchi or rales.   Musculoskeletal:      Cervical back: Neck supple.   Lymphadenopathy:      Cervical: No cervical adenopathy.   Skin:     General: Skin is warm and dry.      Findings: No rash.   Neurological:      Mental Status: She is alert and oriented to person, place, and time.   Psychiatric:         Attention and Perception: Attention and perception normal.         Mood and Affect: Mood and affect normal.         Speech: Speech normal.         Behavior: Behavior normal. Behavior is cooperative.         Thought Content: Thought content normal.         Cognition and Memory: Cognition and memory normal.         Judgment: Judgment normal.        Orders Only on 01/05/2024   Component Date Value Ref Range Status    WBC 01/08/2024 4.70  " 3.40 - 10.80 10*3/mm3 Final    RBC 01/08/2024 4.76  3.77 - 5.28 10*6/mm3 Final    Hemoglobin 01/08/2024 14.0  12.0 - 15.9 g/dL Final    Hematocrit 01/08/2024 42.2  34.0 - 46.6 % Final    MCV 01/08/2024 88.7  79.0 - 97.0 fL Final    MCH 01/08/2024 29.4  26.6 - 33.0 pg Final    MCHC 01/08/2024 33.2  31.5 - 35.7 g/dL Final    RDW 01/08/2024 13.0  12.3 - 15.4 % Final    Platelets 01/08/2024 309  140 - 450 10*3/mm3 Final    Neutrophil Rel % 01/08/2024 69.2  42.7 - 76.0 % Final    Lymphocyte Rel % 01/08/2024 23.2  19.6 - 45.3 % Final    Monocyte Rel % 01/08/2024 3.4 (L)  5.0 - 12.0 % Final    Eosinophil Rel % 01/08/2024 3.0  0.3 - 6.2 % Final    Basophil Rel % 01/08/2024 0.6  0.0 - 1.5 % Final    Neutrophils Absolute 01/08/2024 3.25  1.70 - 7.00 10*3/mm3 Final    Lymphocytes Absolute 01/08/2024 1.09  0.70 - 3.10 10*3/mm3 Final    Monocytes Absolute 01/08/2024 0.16  0.10 - 0.90 10*3/mm3 Final    Eosinophils Absolute 01/08/2024 0.14  0.00 - 0.40 10*3/mm3 Final    Basophils Absolute 01/08/2024 0.03  0.00 - 0.20 10*3/mm3 Final    Immature Granulocyte Rel % 01/08/2024 0.6 (H)  0.0 - 0.5 % Final    Immature Grans Absolute 01/08/2024 0.03  0.00 - 0.05 10*3/mm3 Final    nRBC 01/08/2024 0.0  0.0 - 0.2 /100 WBC Final    Total Cholesterol 01/08/2024 213 (H)  0 - 200 mg/dL Final    Comment: Cholesterol Reference Ranges  (U.S. Department of Health and Human Services ATP III  Classifications)  Desirable          <200 mg/dL  Borderline High    200-239 mg/dL  High Risk          >240 mg/dL  Triglyceride Reference Ranges  (U.S. Department of Health and Human Services ATP III  Classifications)  Normal           <150 mg/dL  Borderline High  150-199 mg/dL  High             200-499 mg/dL  Very High        >500 mg/dL  HDL Reference Ranges  (U.S. Department of Health and Human Services ATP III  Classifications)  Low     <40 mg/dl (major risk factor for CHD)  High    >60 mg/dl ('negative' risk factor for CHD)  LDL Reference Ranges  (U.S.  Department of Health and Human Services ATP III  Classifications)  Optimal          <100 mg/dL  Near Optimal     100-129 mg/dL  Borderline High  130-159 mg/dL  High             160-189 mg/dL  Very High        >189 mg/dL      Triglycerides 01/08/2024 148  0 - 150 mg/dL Final    HDL Cholesterol 01/08/2024 61 (H)  40 - 60 mg/dL Final    VLDL Cholesterol Deon 01/08/2024 26  5 - 40 mg/dL Final    LDL Chol Calc (NIH) 01/08/2024 126 (H)  0 - 100 mg/dL Final    25 Hydroxy, Vitamin D 01/08/2024 111.0 (H)  30.0 - 100.0 ng/ml Final    Comment: Reference Range for Total Vitamin D 25(OH)  Deficiency <20.0 ng/mL  Insufficiency 21-29 ng/mL  Sufficiency  ng/mL  Toxicity >100 ng/ml      TSH 01/08/2024 6.090 (H)  0.270 - 4.200 uIU/mL Final    Glucose 01/08/2024 91  65 - 99 mg/dL Final    BUN 01/08/2024 10  8 - 23 mg/dL Final    Creatinine 01/08/2024 0.83  0.57 - 1.00 mg/dL Final    EGFR Result 01/08/2024 74.5  >60.0 mL/min/1.73 Final    Comment: GFR Normal >60  Chronic Kidney Disease <60  Kidney Failure <15  The GFR formula is only valid for adults with stable renal  function between ages 18 and 70.      BUN/Creatinine Ratio 01/08/2024 12.0  7.0 - 25.0 Final    Sodium 01/08/2024 141  136 - 145 mmol/L Final    Potassium 01/08/2024 3.9  3.5 - 5.2 mmol/L Final    Chloride 01/08/2024 103  98 - 107 mmol/L Final    Total CO2 01/08/2024 27.1  22.0 - 29.0 mmol/L Final    Calcium 01/08/2024 9.5  8.6 - 10.5 mg/dL Final    Total Protein 01/08/2024 6.8  6.0 - 8.5 g/dL Final    Albumin 01/08/2024 4.4  3.5 - 5.2 g/dL Final    Globulin 01/08/2024 2.4  gm/dL Final    A/G Ratio 01/08/2024 1.8  g/dL Final    Total Bilirubin 01/08/2024 0.5  0.0 - 1.2 mg/dL Final    Alkaline Phosphatase 01/08/2024 76  39 - 117 U/L Final    AST (SGOT) 01/08/2024 14  1 - 32 U/L Final    ALT (SGPT) 01/08/2024 13  1 - 33 U/L Final         The following data was reviewed by: Cosme Cho DO on 01/24/2024:  Common labs          7/5/2023    10:13 1/8/2024    09:47    Common Labs   Glucose 104  91    BUN 12  10    Creatinine 0.82  0.83    Sodium 144  141    Potassium 4.7  3.9    Chloride 107  103    Calcium 9.7  9.5    Total Protein 6.9  6.8    Albumin 4.2  4.4    Total Bilirubin 0.3  0.5    Alkaline Phosphatase 78  76    AST (SGOT) 21  14    ALT (SGPT) 18  13    WBC 3.05  4.70    Hemoglobin 14.1  14.0    Hematocrit 40.8  42.2    Platelets 243  309    Total Cholesterol 224  213    Triglycerides 212  148    HDL Cholesterol 60  61    LDL Cholesterol  127  126      Data reviewed : Radiologic studies mammogram           Assessment and Plan   Diagnoses and all orders for this visit:    1. Medicare annual wellness visit, subsequent (Primary)  -     Comprehensive metabolic panel; Future  -     Vitamin D 25 hydroxy; Future  -     TSH; Future  -     T4, free; Future  -     T3, free; Future  -     Lipid panel; Future  -     CBC w AUTO Differential; Future    2. Mixed hyperlipidemia  -     Comprehensive metabolic panel; Future  -     Vitamin D 25 hydroxy; Future  -     TSH; Future  -     T4, free; Future  -     T3, free; Future  -     Lipid panel; Future  -     CBC w AUTO Differential; Future    3. Hypothyroidism (acquired)  -     Comprehensive metabolic panel; Future  -     Vitamin D 25 hydroxy; Future  -     TSH; Future  -     T4, free; Future  -     T3, free; Future  -     Lipid panel; Future  -     CBC w AUTO Differential; Future    4. Avitaminosis D  -     Comprehensive metabolic panel; Future  -     Vitamin D 25 hydroxy; Future  -     TSH; Future  -     T4, free; Future  -     T3, free; Future  -     Lipid panel; Future  -     CBC w AUTO Differential; Future    At this point patient refuses to start on thyroid.  She is going to restart a supplement that she thinks supports thyroid.  We have agreed to recheck fasting labs in May rather than wait a full 6 months.  He must also lower cholesterol in her diet-her LDL goal with 1 risk factor is 130 or less but would like to see this  lower.  Follow-up with me after next labs.       I spent 30 minutes caring for Fatoumata on this date of service. This time includes time spent by me in the following activities:preparing for the visit, reviewing tests, obtaining and/or reviewing a separately obtained history, performing a medically appropriate examination and/or evaluation , counseling and educating the patient/family/caregiver, ordering medications, tests, or procedures, referring and communicating with other health care professionals , documenting information in the medical record, independently interpreting results and communicating that information with the patient/family/caregiver, and care coordination  Follow Up   Return in about 4 months (around 5/24/2024) for Recheck.  Patient was given instructions and counseling regarding her condition or for health maintenance advice. Please see specific information pulled into the AVS if appropriate.

## 2024-01-31 ENCOUNTER — TRANSCRIBE ORDERS (OUTPATIENT)
Dept: ADMINISTRATIVE | Facility: HOSPITAL | Age: 74
End: 2024-01-31
Payer: MEDICARE

## 2024-01-31 DIAGNOSIS — Z12.31 VISIT FOR SCREENING MAMMOGRAM: Primary | ICD-10-CM

## 2024-03-08 ENCOUNTER — TELEPHONE (OUTPATIENT)
Dept: FAMILY MEDICINE CLINIC | Facility: CLINIC | Age: 74
End: 2024-03-08

## 2024-03-08 RX ORDER — CYCLOBENZAPRINE HCL 10 MG
10 TABLET ORAL 3 TIMES DAILY PRN
Qty: 30 TABLET | Refills: 0 | Status: SHIPPED | OUTPATIENT
Start: 2024-03-08

## 2024-03-08 NOTE — TELEPHONE ENCOUNTER
Caller: Fatoumata Perry    Relationship: Self    Best call back number: [phone]    What medication are you requesting: cyclobenzaprine (FLEXERIL) 10 MG tablet     What are your current symptoms: BACK PAIN     If a prescription is needed, what is your preferred pharmacy and phone number: [id number] - [place], [place] - [address] - [phone]  - [phone]     Additional notes: PATIENT IS OUT OF THIS MEDICATION.

## 2024-05-23 DIAGNOSIS — Z00.00 MEDICARE ANNUAL WELLNESS VISIT, SUBSEQUENT: ICD-10-CM

## 2024-05-23 DIAGNOSIS — E55.9 AVITAMINOSIS D: ICD-10-CM

## 2024-05-23 DIAGNOSIS — E03.9 HYPOTHYROIDISM (ACQUIRED): ICD-10-CM

## 2024-05-23 DIAGNOSIS — E78.2 MIXED HYPERLIPIDEMIA: ICD-10-CM

## 2024-05-29 LAB
25(OH)D3+25(OH)D2 SERPL-MCNC: 77.4 NG/ML (ref 30–100)
ALBUMIN SERPL-MCNC: 4.5 G/DL (ref 3.5–5.2)
ALBUMIN/GLOB SERPL: 1.9 G/DL
ALP SERPL-CCNC: 84 U/L (ref 39–117)
ALT SERPL-CCNC: 13 U/L (ref 1–33)
AST SERPL-CCNC: 17 U/L (ref 1–32)
BASOPHILS # BLD AUTO: 0.02 10*3/MM3 (ref 0–0.2)
BASOPHILS NFR BLD AUTO: 0.4 % (ref 0–1.5)
BILIRUB SERPL-MCNC: 0.5 MG/DL (ref 0–1.2)
BUN SERPL-MCNC: 10 MG/DL (ref 8–23)
BUN/CREAT SERPL: 11.5 (ref 7–25)
CALCIUM SERPL-MCNC: 9.3 MG/DL (ref 8.6–10.5)
CHLORIDE SERPL-SCNC: 106 MMOL/L (ref 98–107)
CHOLEST SERPL-MCNC: 201 MG/DL (ref 0–200)
CO2 SERPL-SCNC: 25.7 MMOL/L (ref 22–29)
CREAT SERPL-MCNC: 0.87 MG/DL (ref 0.57–1)
EGFRCR SERPLBLD CKD-EPI 2021: 70 ML/MIN/1.73
EOSINOPHIL # BLD AUTO: 0.16 10*3/MM3 (ref 0–0.4)
EOSINOPHIL NFR BLD AUTO: 3.2 % (ref 0.3–6.2)
ERYTHROCYTE [DISTWIDTH] IN BLOOD BY AUTOMATED COUNT: 13.2 % (ref 12.3–15.4)
GLOBULIN SER CALC-MCNC: 2.4 GM/DL
GLUCOSE SERPL-MCNC: 93 MG/DL (ref 65–99)
HCT VFR BLD AUTO: 42.4 % (ref 34–46.6)
HDLC SERPL-MCNC: 62 MG/DL (ref 40–60)
HGB BLD-MCNC: 14.6 G/DL (ref 12–15.9)
IMM GRANULOCYTES # BLD AUTO: 0.01 10*3/MM3 (ref 0–0.05)
IMM GRANULOCYTES NFR BLD AUTO: 0.2 % (ref 0–0.5)
LDLC SERPL CALC-MCNC: 115 MG/DL (ref 0–100)
LYMPHOCYTES # BLD AUTO: 1.25 10*3/MM3 (ref 0.7–3.1)
LYMPHOCYTES NFR BLD AUTO: 25.3 % (ref 19.6–45.3)
MCH RBC QN AUTO: 30.9 PG (ref 26.6–33)
MCHC RBC AUTO-ENTMCNC: 34.4 G/DL (ref 31.5–35.7)
MCV RBC AUTO: 89.8 FL (ref 79–97)
MONOCYTES # BLD AUTO: 0.19 10*3/MM3 (ref 0.1–0.9)
MONOCYTES NFR BLD AUTO: 3.8 % (ref 5–12)
NEUTROPHILS # BLD AUTO: 3.32 10*3/MM3 (ref 1.7–7)
NEUTROPHILS NFR BLD AUTO: 67.1 % (ref 42.7–76)
NRBC BLD AUTO-RTO: 0 /100 WBC (ref 0–0.2)
PLATELET # BLD AUTO: 281 10*3/MM3 (ref 140–450)
POTASSIUM SERPL-SCNC: 4.2 MMOL/L (ref 3.5–5.2)
PROT SERPL-MCNC: 6.9 G/DL (ref 6–8.5)
RBC # BLD AUTO: 4.72 10*6/MM3 (ref 3.77–5.28)
SODIUM SERPL-SCNC: 145 MMOL/L (ref 136–145)
T3FREE SERPL-MCNC: 3.1 PG/ML (ref 2–4.4)
T4 FREE SERPL-MCNC: 1.19 NG/DL (ref 0.93–1.7)
TRIGL SERPL-MCNC: 134 MG/DL (ref 0–150)
TSH SERPL DL<=0.005 MIU/L-ACNC: 2.84 UIU/ML (ref 0.27–4.2)
VLDLC SERPL CALC-MCNC: 24 MG/DL (ref 5–40)
WBC # BLD AUTO: 4.95 10*3/MM3 (ref 3.4–10.8)

## 2024-06-04 ENCOUNTER — OFFICE VISIT (OUTPATIENT)
Dept: FAMILY MEDICINE CLINIC | Facility: CLINIC | Age: 74
End: 2024-06-04
Payer: MEDICARE

## 2024-06-04 VITALS
DIASTOLIC BLOOD PRESSURE: 82 MMHG | OXYGEN SATURATION: 99 % | SYSTOLIC BLOOD PRESSURE: 120 MMHG | HEIGHT: 63 IN | HEART RATE: 81 BPM | BODY MASS INDEX: 28.53 KG/M2 | TEMPERATURE: 97.8 F | WEIGHT: 161 LBS

## 2024-06-04 DIAGNOSIS — E78.2 MIXED HYPERLIPIDEMIA: Primary | ICD-10-CM

## 2024-06-04 DIAGNOSIS — E55.9 AVITAMINOSIS D: ICD-10-CM

## 2024-06-04 DIAGNOSIS — M54.59 MECHANICAL LOW BACK PAIN: ICD-10-CM

## 2024-06-04 PROCEDURE — 1126F AMNT PAIN NOTED NONE PRSNT: CPT | Performed by: FAMILY MEDICINE

## 2024-06-04 PROCEDURE — 1160F RVW MEDS BY RX/DR IN RCRD: CPT | Performed by: FAMILY MEDICINE

## 2024-06-04 PROCEDURE — 1159F MED LIST DOCD IN RCRD: CPT | Performed by: FAMILY MEDICINE

## 2024-06-04 PROCEDURE — 99213 OFFICE O/P EST LOW 20 MIN: CPT | Performed by: FAMILY MEDICINE

## 2024-06-04 RX ORDER — CYCLOBENZAPRINE HCL 10 MG
10 TABLET ORAL 3 TIMES DAILY PRN
Qty: 270 TABLET | Refills: 1 | Status: SHIPPED | OUTPATIENT
Start: 2024-06-04

## 2024-06-04 NOTE — PROGRESS NOTES
Subjective   Fatoumata Perry is a 74 y.o. female with   Chief Complaint   Patient presents with    Hyperlipidemia    Med Refill     Cyclobenzaprine would like 3 month supply as this will be her last visit bc she will be moving to [place].   .    History of Present Illness   74-year-old female with known history of hyperlipidemia, hypothyroidism as well as vitamin D deficiency here for further medical management.  She also has history of mechanical low back syndrome.  She is currently not using any prescriptive medications with the exclusion of Flexeril that is used on an as-needed basis for her back.  She has been trying to control lipid status with dietary measures alone.  Patient states that she has sold her condo here locally and is moving to [facility].  She has a daughter who lives locally and is now going to live near another daughter in [place].  She understands that she will be finding a new physician in that location and is requesting a 90-day supply of Flexeril.  Fasting labs have been acquired prior to this visit.  Overall she is doing well and is without other acute complaints.  The following portions of the patient's history were reviewed and updated as appropriate: allergies, current medications, past family history, past medical history, past social history, past surgical history and problem list.    Review of Systems   Cardiovascular:         Hyperlipidemia   Musculoskeletal:  Positive for back pain.   All other systems reviewed and are negative.      Objective     Vitals:    06/04/24 1013   BP: 120/82   Pulse: 81   Temp: 97.8 °F (36.6 °C)   SpO2: 99%       Recent Results (from the past 672 hour(s))   CBC w AUTO Differential    Collection Time: 05/28/24 10:21 AM    Specimen: Blood   Result Value Ref Range    WBC 4.95 3.40 - 10.80 10*3/mm3    RBC 4.72 3.77 - 5.28 10*6/mm3    Hemoglobin 14.6 12.0 - 15.9 g/dL    Hematocrit 42.4 34.0 - 46.6 %    MCV 89.8 79.0 - 97.0 fL    MCH 30.9 26.6 - 33.0  pg    MCHC 34.4 31.5 - 35.7 g/dL    RDW 13.2 12.3 - 15.4 %    Platelets 281 140 - 450 10*3/mm3    Neutrophil Rel % 67.1 42.7 - 76.0 %    Lymphocyte Rel % 25.3 19.6 - 45.3 %    Monocyte Rel % 3.8 (L) 5.0 - 12.0 %    Eosinophil Rel % 3.2 0.3 - 6.2 %    Basophil Rel % 0.4 0.0 - 1.5 %    Neutrophils Absolute 3.32 1.70 - 7.00 10*3/mm3    Lymphocytes Absolute 1.25 0.70 - 3.10 10*3/mm3    Monocytes Absolute 0.19 0.10 - 0.90 10*3/mm3    Eosinophils Absolute 0.16 0.00 - 0.40 10*3/mm3    Basophils Absolute 0.02 0.00 - 0.20 10*3/mm3    Immature Granulocyte Rel % 0.2 0.0 - 0.5 %    Immature Grans Absolute 0.01 0.00 - 0.05 10*3/mm3    nRBC 0.0 0.0 - 0.2 /100 WBC   Lipid panel    Collection Time: 05/28/24 10:21 AM    Specimen: Blood   Result Value Ref Range    Total Cholesterol 201 (H) 0 - 200 mg/dL    Triglycerides 134 0 - 150 mg/dL    HDL Cholesterol 62 (H) 40 - 60 mg/dL    VLDL Cholesterol Deon 24 5 - 40 mg/dL    LDL Chol Calc (NIH) 115 (H) 0 - 100 mg/dL   T3, free    Collection Time: 05/28/24 10:21 AM    Specimen: Blood   Result Value Ref Range    T3, Free 3.1 2.0 - 4.4 pg/mL   T4, free    Collection Time: 05/28/24 10:21 AM    Specimen: Blood   Result Value Ref Range    Free T4 1.19 0.93 - 1.70 ng/dL   TSH    Collection Time: 05/28/24 10:21 AM    Specimen: Blood   Result Value Ref Range    TSH 2.840 0.270 - 4.200 uIU/mL   Vitamin D 25 hydroxy    Collection Time: 05/28/24 10:21 AM    Specimen: Blood   Result Value Ref Range    25 Hydroxy, Vitamin D 77.4 30.0 - 100.0 ng/ml   Comprehensive metabolic panel    Collection Time: 05/28/24 10:21 AM    Specimen: Blood   Result Value Ref Range    Glucose 93 65 - 99 mg/dL    BUN 10 8 - 23 mg/dL    Creatinine 0.87 0.57 - 1.00 mg/dL    EGFR Result 70.0 >60.0 mL/min/1.73    BUN/Creatinine Ratio 11.5 7.0 - 25.0    Sodium 145 136 - 145 mmol/L    Potassium 4.2 3.5 - 5.2 mmol/L    Chloride 106 98 - 107 mmol/L    Total CO2 25.7 22.0 - 29.0 mmol/L    Calcium 9.3 8.6 - 10.5 mg/dL    Total Protein  6.9 6.0 - 8.5 g/dL    Albumin 4.5 3.5 - 5.2 g/dL    Globulin 2.4 gm/dL    A/G Ratio 1.9 g/dL    Total Bilirubin 0.5 0.0 - 1.2 mg/dL    Alkaline Phosphatase 84 39 - 117 U/L    AST (SGOT) 17 1 - 32 U/L    ALT (SGPT) 13 1 - 33 U/L       Physical Exam  Vitals and nursing note reviewed.   Constitutional:       Appearance: Normal appearance. She is well-developed and well-groomed.   HENT:      Head: Normocephalic and atraumatic.   Neck:      Thyroid: No thyroid mass or thyromegaly.      Vascular: Normal carotid pulses. No carotid bruit.      Trachea: Trachea and phonation normal.   Cardiovascular:      Rate and Rhythm: Normal rate and regular rhythm.      Heart sounds: Normal heart sounds. No murmur heard.     No friction rub. No gallop.   Pulmonary:      Effort: Pulmonary effort is normal. No respiratory distress.      Breath sounds: Normal breath sounds. No decreased breath sounds, wheezing, rhonchi or rales.   Musculoskeletal:      Cervical back: Neck supple.   Lymphadenopathy:      Cervical: No cervical adenopathy.   Skin:     General: Skin is warm and dry.      Findings: No rash.   Neurological:      Mental Status: She is alert and oriented to person, place, and time.   Psychiatric:         Attention and Perception: Attention and perception normal.         Mood and Affect: Mood and affect normal.         Speech: Speech normal.         Behavior: Behavior normal. Behavior is cooperative.         Thought Content: Thought content normal.         Cognition and Memory: Cognition and memory normal.         Judgment: Judgment normal.         Assessment & Plan   Diagnoses and all orders for this visit:    1. Mixed hyperlipidemia (Primary)    2. Avitaminosis D    3. Mechanical low back pain  -     cyclobenzaprine (FLEXERIL) 10 MG tablet; Take 1 tablet by mouth 3 (Three) Times a Day As Needed for Muscle Spasms.  Dispense: 270 tablet; Refill: 1    Patient was advised to continue to watch cholesterol in her diet and although LDL  goal of 130 or less has been achieved the further she gets towards 100 the better.  Flexeril has been provided as above with anticipation of follow-up here only if she moves back to the local area.  She will find a physician in [place] and follow-up with them.    Return if symptoms worsen or fail to improve.

## (undated) DEVICE — LN SMPL CO2 SHTRM SD STREAM W/M LUER

## (undated) DEVICE — TUBING, SUCTION, 1/4" X 10', STRAIGHT: Brand: MEDLINE

## (undated) DEVICE — CANN O2 ETCO2 FITS ALL CONN CO2 SMPL A/ 7IN DISP LF

## (undated) DEVICE — ADAPT CLN BIOGUARD AIR/H2O DISP

## (undated) DEVICE — KT ORCA ORCAPOD DISP STRL

## (undated) DEVICE — SENSR O2 OXIMAX FNGR A/ 18IN NONSTR